# Patient Record
Sex: FEMALE | Race: WHITE | NOT HISPANIC OR LATINO | Employment: UNEMPLOYED | URBAN - METROPOLITAN AREA
[De-identification: names, ages, dates, MRNs, and addresses within clinical notes are randomized per-mention and may not be internally consistent; named-entity substitution may affect disease eponyms.]

---

## 2017-01-12 PROBLEM — G47.33 OSA (OBSTRUCTIVE SLEEP APNEA): Status: ACTIVE | Noted: 2017-01-12

## 2017-01-12 PROBLEM — E66.01 SEVERE OBESITY (BMI 35.0-39.9): Status: ACTIVE | Noted: 2017-01-12

## 2017-01-12 PROBLEM — I10 ESSENTIAL HYPERTENSION: Status: ACTIVE | Noted: 2017-01-12

## 2017-01-12 PROBLEM — E11.9 TYPE 2 DIABETES MELLITUS WITHOUT COMPLICATION: Status: ACTIVE | Noted: 2017-01-12

## 2019-03-26 ENCOUNTER — HISTORICAL (OUTPATIENT)
Dept: ADMINISTRATIVE | Facility: HOSPITAL | Age: 49
End: 2019-03-26

## 2019-03-26 LAB
ABS NEUT (OLG): 4.68 X10(3)/MCL (ref 2.1–9.2)
ALBUMIN SERPL-MCNC: 4.2 GM/DL (ref 3.4–5)
ALBUMIN/GLOB SERPL: 1.2 RATIO (ref 1.1–2)
ALP SERPL-CCNC: 105 UNIT/L (ref 45–117)
ALT SERPL-CCNC: 36 UNIT/L (ref 12–78)
AST SERPL-CCNC: 16 UNIT/L (ref 15–37)
BASOPHILS # BLD AUTO: 0.04 X10(3)/MCL
BASOPHILS NFR BLD AUTO: 0 %
BILIRUB SERPL-MCNC: 0.7 MG/DL (ref 0.2–1)
BILIRUBIN DIRECT+TOT PNL SERPL-MCNC: 0.2 MG/DL
BILIRUBIN DIRECT+TOT PNL SERPL-MCNC: 0.5 MG/DL
BUN SERPL-MCNC: 8 MG/DL (ref 7–18)
CALCIUM SERPL-MCNC: 9.3 MG/DL (ref 8.5–10.1)
CHLORIDE SERPL-SCNC: 102 MMOL/L (ref 98–107)
CO2 SERPL-SCNC: 30 MMOL/L (ref 21–32)
CREAT SERPL-MCNC: 0.8 MG/DL (ref 0.6–1.3)
CRP SERPL-MCNC: 0.6 MG/DL
EOSINOPHIL # BLD AUTO: 0.22 X10(3)/MCL
EOSINOPHIL NFR BLD AUTO: 2 %
ERYTHROCYTE [DISTWIDTH] IN BLOOD BY AUTOMATED COUNT: 11.8 % (ref 11.5–14.5)
ERYTHROCYTE [SEDIMENTATION RATE] IN BLOOD: 2 MM/HR (ref 0–20)
GLOBULIN SER-MCNC: 3.4 GM/ML (ref 2.3–3.5)
GLUCOSE SERPL-MCNC: 228 MG/DL (ref 74–106)
HBV CORE AB SERPL QL IA: NONREACTIVE
HBV CORE IGM SERPL QL IA: NONREACTIVE
HBV SURFACE AG SERPL QL IA: NEGATIVE
HCT VFR BLD AUTO: 40.8 % (ref 35–46)
HCV AB SERPL QL IA: NONREACTIVE
HGB BLD-MCNC: 14.4 GM/DL (ref 12–16)
IMM GRANULOCYTES # BLD AUTO: 0.03 10*3/UL
IMM GRANULOCYTES NFR BLD AUTO: 0 %
LYMPHOCYTES # BLD AUTO: 3.08 X10(3)/MCL
LYMPHOCYTES NFR BLD AUTO: 36 % (ref 13–40)
MCH RBC QN AUTO: 31.9 PG (ref 26–34)
MCHC RBC AUTO-ENTMCNC: 35.3 GM/DL (ref 31–37)
MCV RBC AUTO: 90.3 FL (ref 80–100)
MONOCYTES # BLD AUTO: 0.61 X10(3)/MCL
MONOCYTES NFR BLD AUTO: 7 % (ref 4–12)
NEG CONT SPOT COUNT: NORMAL
NEUTROPHILS # BLD AUTO: 4.68 X10(3)/MCL
NEUTROPHILS NFR BLD AUTO: 54 X10(3)/MCL
PANEL A SPOT COUNT: 0
PANEL B SPOT COUNT: 0
PLATELET # BLD AUTO: 170 X10(3)/MCL (ref 130–400)
PMV BLD AUTO: 12.1 FL (ref 7.4–10.4)
POS CONT SPOT COUNT: NORMAL
POTASSIUM SERPL-SCNC: 3.7 MMOL/L (ref 3.5–5.1)
PROT SERPL-MCNC: 7.6 GM/DL (ref 6.4–8.2)
RBC # BLD AUTO: 4.52 X10(6)/MCL (ref 4–5.2)
SODIUM SERPL-SCNC: 136 MMOL/L (ref 136–145)
T-SPOT.TB: NORMAL
WBC # SPEC AUTO: 8.7 X10(3)/MCL (ref 4.5–11)

## 2019-08-08 ENCOUNTER — HISTORICAL (OUTPATIENT)
Dept: ADMINISTRATIVE | Facility: HOSPITAL | Age: 49
End: 2019-08-08

## 2019-08-08 LAB
ABS NEUT (OLG): 4.56 X10(3)/MCL (ref 2.1–9.2)
ALBUMIN SERPL-MCNC: 4.2 GM/DL (ref 3.4–5)
ALBUMIN/GLOB SERPL: 1.2 RATIO (ref 1.1–2)
ALP SERPL-CCNC: 115 UNIT/L (ref 45–117)
ALT SERPL-CCNC: 33 UNIT/L (ref 12–78)
AST SERPL-CCNC: 14 UNIT/L (ref 15–37)
BASOPHILS # BLD AUTO: 0.03 X10(3)/MCL
BASOPHILS NFR BLD AUTO: 0 %
BILIRUB SERPL-MCNC: 0.7 MG/DL (ref 0.2–1)
BILIRUBIN DIRECT+TOT PNL SERPL-MCNC: 0.2 MG/DL
BILIRUBIN DIRECT+TOT PNL SERPL-MCNC: 0.5 MG/DL
BUN SERPL-MCNC: 7 MG/DL (ref 7–18)
CALCIUM SERPL-MCNC: 9.4 MG/DL (ref 8.5–10.1)
CHLORIDE SERPL-SCNC: 104 MMOL/L (ref 98–107)
CO2 SERPL-SCNC: 30 MMOL/L (ref 21–32)
CREAT SERPL-MCNC: 0.7 MG/DL (ref 0.6–1.3)
CRP SERPL-MCNC: 0.5 MG/DL
EOSINOPHIL # BLD AUTO: 0.34 X10(3)/MCL
EOSINOPHIL NFR BLD AUTO: 3 %
ERYTHROCYTE [DISTWIDTH] IN BLOOD BY AUTOMATED COUNT: 11.7 % (ref 11.5–14.5)
ERYTHROCYTE [SEDIMENTATION RATE] IN BLOOD: 6 MM/HR (ref 0–20)
GLOBULIN SER-MCNC: 3.4 GM/ML (ref 2.3–3.5)
GLUCOSE SERPL-MCNC: 162 MG/DL (ref 74–106)
HCT VFR BLD AUTO: 42.7 % (ref 35–46)
HGB BLD-MCNC: 14.7 GM/DL (ref 12–16)
IMM GRANULOCYTES # BLD AUTO: 0.02 10*3/UL
IMM GRANULOCYTES NFR BLD AUTO: 0 %
LYMPHOCYTES # BLD AUTO: 4.68 X10(3)/MCL
LYMPHOCYTES NFR BLD AUTO: 46 % (ref 13–40)
MCH RBC QN AUTO: 31.5 PG (ref 26–34)
MCHC RBC AUTO-ENTMCNC: 34.4 GM/DL (ref 31–37)
MCV RBC AUTO: 91.4 FL (ref 80–100)
MONOCYTES # BLD AUTO: 0.59 X10(3)/MCL
MONOCYTES NFR BLD AUTO: 6 % (ref 0–24)
NEUTROPHILS # BLD AUTO: 4.56 X10(3)/MCL
NEUTROPHILS NFR BLD AUTO: 45 X10(3)/MCL
PLATELET # BLD AUTO: 174 X10(3)/MCL (ref 130–400)
PMV BLD AUTO: 11.8 FL (ref 7.4–10.4)
POTASSIUM SERPL-SCNC: 4 MMOL/L (ref 3.5–5.1)
PROT SERPL-MCNC: 7.6 GM/DL (ref 6.4–8.2)
RBC # BLD AUTO: 4.67 X10(6)/MCL (ref 4–5.2)
SODIUM SERPL-SCNC: 140 MMOL/L (ref 136–145)
WBC # SPEC AUTO: 10.2 X10(3)/MCL (ref 4.5–11)

## 2021-02-08 ENCOUNTER — HISTORICAL (OUTPATIENT)
Dept: ADMINISTRATIVE | Facility: HOSPITAL | Age: 51
End: 2021-02-08

## 2022-04-11 ENCOUNTER — HISTORICAL (OUTPATIENT)
Dept: ADMINISTRATIVE | Facility: HOSPITAL | Age: 52
End: 2022-04-11

## 2022-04-25 VITALS
DIASTOLIC BLOOD PRESSURE: 84 MMHG | BODY MASS INDEX: 35.16 KG/M2 | OXYGEN SATURATION: 99 % | SYSTOLIC BLOOD PRESSURE: 127 MMHG | WEIGHT: 198.44 LBS | HEIGHT: 63 IN

## 2022-06-28 DIAGNOSIS — L40.50 PSORIATIC ARTHRITIS: Primary | ICD-10-CM

## 2022-06-28 RX ORDER — FOLIC ACID 1 MG/1
1000 TABLET ORAL DAILY
COMMUNITY
Start: 2022-06-24 | End: 2022-09-06 | Stop reason: SDUPTHER

## 2022-06-28 RX ORDER — AZITHROMYCIN 250 MG/1
250 TABLET, FILM COATED ORAL
COMMUNITY
Start: 2022-01-04 | End: 2022-12-01 | Stop reason: ALTCHOICE

## 2022-06-28 RX ORDER — IXEKIZUMAB 80 MG/ML
80 INJECTION, SOLUTION SUBCUTANEOUS
Qty: 3 EACH | Refills: 1 | Status: SHIPPED | OUTPATIENT
Start: 2022-06-28 | End: 2022-12-01 | Stop reason: SDUPTHER

## 2022-06-28 RX ORDER — IXEKIZUMAB 80 MG/ML
INJECTION, SOLUTION SUBCUTANEOUS
COMMUNITY
Start: 2021-11-08 | End: 2022-06-28 | Stop reason: SDUPTHER

## 2022-06-28 RX ORDER — MUPIROCIN 20 MG/G
OINTMENT TOPICAL 3 TIMES DAILY
COMMUNITY
Start: 2022-01-04

## 2022-06-28 RX ORDER — ATORVASTATIN CALCIUM 10 MG/1
10 TABLET, FILM COATED ORAL DAILY
COMMUNITY
Start: 2022-06-24

## 2022-06-28 RX ORDER — INSULIN LISPRO 100 [IU]/ML
INJECTION, SOLUTION INTRAVENOUS; SUBCUTANEOUS
COMMUNITY
Start: 2021-08-09 | End: 2023-08-07 | Stop reason: ALTCHOICE

## 2022-06-28 RX ORDER — POTASSIUM CHLORIDE 750 MG/1
10 CAPSULE, EXTENDED RELEASE ORAL DAILY
COMMUNITY
Start: 2022-06-24 | End: 2022-12-01 | Stop reason: SDUPTHER

## 2022-06-28 RX ORDER — INSULIN DETEMIR 100 [IU]/ML
INJECTION, SOLUTION SUBCUTANEOUS
COMMUNITY
Start: 2021-08-09 | End: 2022-12-01 | Stop reason: SDUPTHER

## 2022-06-28 RX ORDER — PEN NEEDLE, DIABETIC 31 GX5/16"
1 NEEDLE, DISPOSABLE MISCELLANEOUS 3 TIMES DAILY
COMMUNITY
Start: 2022-01-18

## 2022-06-28 NOTE — TELEPHONE ENCOUNTER
----- Message from Enma Andrew sent at 6/28/2022 10:24 AM CDT -----  Regarding: Medication Management  Pt is in need of Rx TALTZ INJECTION. Pt needs it sent to [facility].    Thank You

## 2022-09-06 DIAGNOSIS — L40.50 PSORIATIC ARTHRITIS: Primary | ICD-10-CM

## 2022-09-06 RX ORDER — NIRMATRELVIR AND RITONAVIR 300-100 MG
KIT ORAL
COMMUNITY
Start: 2022-07-11 | End: 2022-12-01

## 2022-09-06 RX ORDER — FOLIC ACID 1 MG/1
1000 TABLET ORAL DAILY
Qty: 30 TABLET | Refills: 3 | Status: SHIPPED | OUTPATIENT
Start: 2022-09-06 | End: 2022-12-01 | Stop reason: SDUPTHER

## 2022-09-07 NOTE — TELEPHONE ENCOUNTER
PA approved  Refills of both Taltz & Otrexup sent to [facility] pharmacy    Called pt to let her know everything in place and that she should call me if she has any issues  Pt agreed

## 2022-11-22 PROBLEM — L40.9 PSORIASIS: Status: ACTIVE | Noted: 2022-11-22

## 2022-11-22 PROBLEM — L40.50 PSORIASIS WITH ARTHROPATHY: Status: ACTIVE | Noted: 2022-11-22

## 2022-11-22 PROBLEM — I10 PRIMARY HYPERTENSION: Status: ACTIVE | Noted: 2017-01-12

## 2022-11-22 PROBLEM — M79.7 FIBROMYALGIA: Status: ACTIVE | Noted: 2022-11-22

## 2022-11-22 PROBLEM — G47.33 OBSTRUCTIVE SLEEP APNEA SYNDROME: Status: ACTIVE | Noted: 2017-01-12

## 2022-11-22 PROBLEM — G57.03 PIRIFORMIS SYNDROME OF BOTH SIDES: Status: ACTIVE | Noted: 2022-11-22

## 2022-11-22 PROBLEM — Z79.899 HIGH RISK MEDICATION USE: Status: ACTIVE | Noted: 2022-11-22

## 2022-11-22 PROBLEM — M19.90 OSTEOARTHRITIS: Status: ACTIVE | Noted: 2022-11-22

## 2022-11-30 PROBLEM — L40.50 PSORIATIC ARTHRITIS: Status: ACTIVE | Noted: 2022-11-30

## 2022-11-30 PROBLEM — E66.9 OBESITY: Status: ACTIVE | Noted: 2022-11-30

## 2022-11-30 PROBLEM — M06.00 SERONEGATIVE RHEUMATOID ARTHRITIS: Status: ACTIVE | Noted: 2022-11-30

## 2022-11-30 PROBLEM — M25.50 ARTHRALGIA: Status: ACTIVE | Noted: 2022-11-30

## 2022-11-30 NOTE — PROGRESS NOTES
"  Patient ID: [id number]     Chief Complaint: Follow up- New to [facility] (Right toe, knees, and shoulder has been hurting more now. /Right ringers go numb often. /Cannot make a fist on the right hand, middle finger will not close down.  /Lower back still hurts a lot. /Right elbow was hit months ago, still causing pain. )      Referred By: No ref. provider found     HPI:     Aneta Tyson is a 52 y.o. female here today for a follow up for psoriatic arthritis.      History of psoriatic arthritis and Fibromylagia.  Diagnosed with psoriasis around 1997, diagnosed with psoriatic arthritis around 2015. She started methotrexate in the past, stopped due to elevated liver enzymes. Otezla was started and significant GI  issues. In late October 2017,  changed to Stelara. At f/u she  found improvement with Stelara in her skin, but not in her joints and lost efficacy.  Has failed Enbrel in the past. Started Taltz in April 2019 w/good response w/skin and joints. Injection site reaction reported lasting 3-4 days. She is a former ballet dancer, used to wear toe shoes. Pain is with flexion of the great toes. Xrays show loss of joint space.    Today she C/o right shoulder/elbow/knee and left great toe are painful- denies any red/swelling or warmth to these joints.  She is asking to have her right shoulder injected today due to pain- states limited ROM, + pain when laying on her right side at night time. States bilateral feet "burn" at night time- most likely neuropathy from DM.  Denies red/warm/swollen joints of hands. Does have right hand trigger finger to middle finger- has had a injection in the past with no relief- does need surgery but is looking for an orthopedic that takes her insurance- she does live 2.5 hours away but would like referral to orthopedics here for eval as she is also having some elbow pain as well that is shooting into her hand at times.   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 She does have a nodule to her right inner elbow and right wrist, denies pain to these areas.     Past medical history is notable for COPD, hernia, GERD, hypothyroidism, hypertension, and hyperlipidemia.    Denies history of fevers, rashes, photosensitivity, oral or nasal ulcers, h/o MI, stroke, seizures, h/o PE or DVT, Raynaud's phenomenon, uveitis, malignancies.     Family history of autoimmune disease: none.    Pregnancies: 1 (twins)  Miscarriages: none.     Smoking: occ smoking/vaps a few times a months- enc to quit- discussed increased risk of inflammation with tobacco products.   Social History     Tobacco Use   Smoking Status Some Days    Types: Cigarettes, Vaping with nicotine   Smokeless Tobacco Not on file   Tobacco Comments    Smokes and vapes occasionally. One pack lasts about a month.      ----------------------------  COPD (chronic obstructive pulmonary disease)  Diabetes mellitus  GERD (gastroesophageal reflux disease)  Hypertension  Psoriatic arthritis  Thyroid disease     Past Surgical History:   Procedure Laterality Date    ADENOIDECTOMY      APPENDECTOMY      CARPAL TUNNEL RELEASE       SECTION      CHOLECYSTECTOMY      COLONOSCOPY      HYSTERECTOMY      NISSEN FUNDOPLICATION      TONSILLECTOMY      UVULECTOMY         Review of patient's allergies indicates:   Allergen Reactions    Doxycycline Diarrhea     severe    Promethazine Nausea And Vomiting       Outpatient Medications Marked as Taking for the 22 encounter (Office Visit) with Tia Chase MD   Medication Sig Dispense Refill    atorvastatin (LIPITOR) 10 MG tablet Take 10 mg by mouth once daily.      BD ULTRA-FINE MINI PEN NEEDLE 31 gauge x 3/16" Ndle Inject 1 each into the skin 3 (three) times daily.      cetirizine (ZYRTEC) 10 MG tablet Take 10 mg by mouth once daily.      duloxetine (CYMBALTA) 60 MG capsule Take 60 mg by mouth once daily.      glipiZIDE (GLUCOTROL) 10 MG TR24 Take 10 " mg by mouth 2 (two) times daily.      hydrocodone-acetaminophen 5-325mg (NORCO) 5-325 mg per tablet Take 1 tablet by mouth 3 (three) times daily as needed for Pain.      insulin lispro 100 unit/mL injection Inject into the skin.      levothyroxine (SYNTHROID) 50 MCG tablet Take 50 mcg by mouth once daily.      lisinopril (PRINIVIL,ZESTRIL) 20 MG tablet Take 20 mg by mouth once daily.      montelukast (SINGULAIR) 10 mg tablet Take 10 mg by mouth every evening.      mupirocin (BACTROBAN) 2 % ointment Apply topically 3 (three) times daily.      pantoprazole (PROTONIX) 40 MG tablet Take 40 mg by mouth once daily.      potassium chloride (KLOR-CON) 10 MEQ TbSR Take 10 mEq by mouth.      TRESIBA FLEXTOUCH U-100 100 unit/mL (3 mL) insulin pen Inject into the skin.      verapamil (CALAN-SR) 240 MG CR tablet Take 240 mg by mouth every evening.      [DISCONTINUED] folic acid (FOLVITE) 1 MG tablet Take 1 tablet (1,000 mcg total) by mouth once daily. 30 tablet 3    [DISCONTINUED] ixekizumab (TALTZ AUTOINJECTOR) 80 mg/mL AtIn Inject 80 mg into the skin every 28 days. Rotate injection sites, hold for infection 3 each 1    [DISCONTINUED] methotrexate, PF, (OTREXUP) 20 mg/0.4 mL AtIn Inject 0.4 mLs (20 mg total) into the skin every 7 days. 1.6 mL 3     Current Facility-Administered Medications for the 12/1/22 encounter (Office Visit) with Tia Chase MD   Medication Dose Route Frequency Provider Last Rate Last Admin    triamcinolone acetonide injection 40 mg  40 mg Intra-articular 1 time in Clinic/HOD CHINO AdamsP        [DISCONTINUED] LIDOcaine HCL 10 mg/ml (1%) injection 1 mL  1 mL Intra-articular 1 time in Clinic/BRUNO Vogt        [DISCONTINUED] LIDOcaine HCL 10 mg/ml (1%) injection 1 mL  1 mL Intra-articular 1 time in Clinic/BRUNO Vogt           Social History     Socioeconomic History    Marital status: Single   Tobacco Use    Smoking status: Some Days      Types: Cigarettes, Vaping with nicotine    Tobacco comments:     Smokes and vapes occasionally. One pack lasts about a month.   Substance and Sexual Activity    Alcohol use: No        Family History   Problem Relation Age of Onset    Hypertension Mother     Atrial fibrillation Father     Heart disease Father     Diabetes Maternal Grandmother           There is no immunization history on file for this patient.    Patient Care Team:  Donny Recio MD as PCP - General (Family Medicine)     Subjective:     ROS    Constitutional:  Denies chills. Denies fever. Denies night sweats. Denies weight loss.   Ophthalmology: Admits blurred vision from DM retinopathy- follows with ophthalmology. Denies dry eyes. Denies eye pain. Denies Itching and redness.   ENT: Denies oral ulcers. Denies epistaxis. Admits dry mouth when taking Norco. Denies swollen glands.   Endocrine: Admits diabetes. Admits thyroid Problems- hypothyroidism on Synthroid-both followed by PCP.  Respiratory: Denies cough. Denies shortness of breath. Denies shortness of breath with exertion. Denies hemoptysis.   Cardiovascular: Denies chest pain at rest. Denies chest pain with exertion. Denies palpitations.    Gastrointestinal: Denies abdominal pain. Admits diarrhea and constipation at times, follows with GI- does have history of Diverticulosis, no IBD. Denies nausea. Denies vomiting. Denies hematemesis or hematochezia. Denies heartburn.  Genitourinary: Denies blood in urine.  Musculoskeletal: See HPI for details  Integumentary: Denies rash. Denies photosensitivity.   Peripheral Vascular: Denies Ulcers of hands and/or feet. Denies Cold extremities.   Neurologic: Denies dizziness. Admits headache for the past few days- feels sinus related.  Denies loss of strength. Admits burning to bilateral feet at night time- most likely neuropathy from DM.  Psychiatric: Denies depression. Denies anxiety. Denies suicidal/homicidal ideations.      Objective:     /79  "(BP Location: Right arm, Patient Position: Sitting)   Pulse 97   Temp 98.6 °F (37 °C) (Oral)   Resp 16   Ht 5' 2" (1.575 m)   Wt 89.9 kg (198 lb 3.2 oz)   SpO2 96%   BMI 36.25 kg/m²     Physical Exam    General Appearance: alert, pleasant, in no acute distress.  Skin: Skin color, texture, turgor normal. No rashes. 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 3-4 cm annular lesions with irregular margins and slightly erythematous and raised, non tender, on bilateral shins, 3-4 in number.   ENT: No oral or nasal ulcers.  Neck:  Neck supple. No adenopathy.   Lungs: CTA throughout without crackles, rhonchi, or wheezes.   Heart: RRR w/o murmurs.  No edema. 2+ DP pulse.  Neuro: Alert, oriented, CN II-XII GI, sensory and motor innervation intact.  Musculoskeletal: See below  Psych: Alert, oriented, normal eye contact.    Joint Exam:  DIPs, PIPs, MCPs: + pain on palpation to bilateral MTPs, synovial thickening noted to bilateral MCPs, no swelling or redness, + nodule noted to left wrist, nontender.  Wrists: no pain on palpation, no swelling or redness, good range of motion, + nodule noted to left wrist, no pain on palpation.  Elbows: no pain on palpation, no swelling or redness, good range of motion, no nodules.  Shoulders: + pain on palpation, no swelling or redness, pain with range of motion noted to right shoulder, limited Apley.  Hips: no pain on palpation, good range of motion.  Knees: no pain on palpation, +crepitus with knees, no swelling or redness, good range of motion.  Ankles: no pain on palpation, no swelling or redness, good range of motion.  Feet: +MTP pain on palpation to bilateral feet, no swelling or redness, no nodules.    Labs:   2/8/2021: Infectious work up (-) - will repeat at f/u visit     Lab Results   Component Value Date    WBC 7.9 12/01/2022    HGB 13.7 12/01/2022    HCT 40.4 12/01/2022     12/01/2022    ALT 25 12/01/2022    AST 14 12/01/2022    " BUN 14.6 12/01/2022    CREATININE 0.90 12/01/2022     12/01/2022    K 4.3 12/01/2022    CO2 25 12/01/2022    CRP 2.00 12/01/2022    SEDRATE 1 12/01/2022        Imaging:   3/26/2019 XR Hand 3 views of both hands: IMPRESSION: Left/Right: No evidence for inflammatory arthropathy or osteoarthritis. Radial angulation of the distal interphalangeal joints of the fifth fingers. Mild positive ulnar variance.    Left foot 3 views: Tiny anterior calcaneal spur. IMPRESSION: No significant abnormality identified  Right foot 3 views: Minimal narrowing of the distal interphalangeal joints articular spaces are otherwise normal.    XR Chest 2 Views: IMPRESSION: No acute Cardiopulmonary process identified.     2/8/2021- XR Lumbar spine 4 views or more: Slight levoscoliosis with maximum curvature at the level of L2/L3 vertebral bodies are of normal height, position and alignment with some degenerative changes of the posterior elements mostly at L5-S1. IMPRESSION: Minimal degenerative changes.    Assessment:       ICD-10-CM ICD-9-CM   1. Psoriatic arthritis  L40.50 696.0   2. Psoriasis  L40.9 696.1   3. Fibromyalgia  M79.7 729.1   4. High risk medication use  Z79.899 V58.69   5. Osteoarthritis, unspecified osteoarthritis type, unspecified site  M19.90 715.90   6. Primary hypertension  I10 401.9   7. Type 2 diabetes mellitus without complication, with long-term current use of insulin  E11.9 250.00    Z79.4 V58.67   8. JENNIFER (obstructive sleep apnea)  G47.33 327.23   9. Obesity, unspecified classification, unspecified obesity type, unspecified whether serious comorbidity present  E66.9 278.00   10. Trigger middle finger of right hand  M65.331 727.03   11. Chronic right shoulder pain  M25.511 719.41    G89.29 338.29        Plan:     1. Psoriasic Arthritis and Psoriasis: Dx with psoriasis 1997, dx with psoriatic arthritis 2015. -Failed treatment with MTX (elevated liver enzymes). Otezla - significant GI  issues. Stelara- lost efficacy.   Has also failed Enbrel in the past.  - Continue Taltz 80mg every 4 weeks subcu.   - Otrexup 20mg/subcu weekly.   -Continue daily folic acid supplements.     3. Fibromyalgia   -Reinforced sleep hygiene, control of underlying anxiety and depression, and encouraged gentle exercise.     4. High risk medication use   -Advised to stay up-to-date on age appropriate vaccinations and malignancy screening with PCP.   -Continued lab monitoring     5. Osteoarthritis  -Knees and b/l 1st MTP joint. +crepitus with knees.   -OTC topicals ok to continue to use, warm compresses and weight loss.      6. Primary hypertension and T2DM   -Followed by PCP      8. JENNIFER (obstructive sleep apnea)   -Enc use of CPAP/followed by PCP      9. Obesity  -Enc diet modifications and exercise as tolerated (TLC)       10. Trigger finger of middle right hand         -Referral to Ortho made for further eval  11. Right Shoulder pain   -Verbal consent was obtained. The patient was placed in the appropriate anatomic position. The area was marked and prepped to created a sterile environment. 1 ml kenalog 40mg placed in a syringe. Using a 21 gauge needle, 40 mg of Kenolog was injected in the the right posterior GH joint, by sterile technique. The patient tolerated the procedure well. The patient was hemostatic post procedure. Skin care directions were given.    -If no relief she was advised to follow up with her PCP for possible PT    Follow up in about 6 months (around 6/1/2023). In addition to their scheduled follow up, the patient has also been instructed to follow up on as needed basis.      Total time spent with patient and documentation is more than 60 minutes. All questions were answered to patient's satisfaction and patient verbalized understanding.

## 2022-12-01 ENCOUNTER — LAB VISIT (OUTPATIENT)
Dept: LAB | Facility: HOSPITAL | Age: 52
End: 2022-12-01
Attending: NURSE PRACTITIONER
Payer: MEDICAID

## 2022-12-01 ENCOUNTER — OFFICE VISIT (OUTPATIENT)
Dept: RHEUMATOLOGY | Facility: CLINIC | Age: 52
End: 2022-12-01
Payer: MEDICAID

## 2022-12-01 VITALS
RESPIRATION RATE: 16 BRPM | WEIGHT: 198.19 LBS | TEMPERATURE: 99 F | SYSTOLIC BLOOD PRESSURE: 128 MMHG | HEIGHT: 62 IN | HEART RATE: 97 BPM | OXYGEN SATURATION: 96 % | DIASTOLIC BLOOD PRESSURE: 79 MMHG | BODY MASS INDEX: 36.47 KG/M2

## 2022-12-01 DIAGNOSIS — M79.7 FIBROMYALGIA: ICD-10-CM

## 2022-12-01 DIAGNOSIS — L40.50 PSORIATIC ARTHRITIS: Primary | ICD-10-CM

## 2022-12-01 DIAGNOSIS — I10 PRIMARY HYPERTENSION: ICD-10-CM

## 2022-12-01 DIAGNOSIS — M19.90 OSTEOARTHRITIS, UNSPECIFIED OSTEOARTHRITIS TYPE, UNSPECIFIED SITE: ICD-10-CM

## 2022-12-01 DIAGNOSIS — L40.9 PSORIASIS: ICD-10-CM

## 2022-12-01 DIAGNOSIS — M65.331 TRIGGER MIDDLE FINGER OF RIGHT HAND: ICD-10-CM

## 2022-12-01 DIAGNOSIS — Z79.4 TYPE 2 DIABETES MELLITUS WITHOUT COMPLICATION, WITH LONG-TERM CURRENT USE OF INSULIN: ICD-10-CM

## 2022-12-01 DIAGNOSIS — L40.50 PSORIATIC ARTHRITIS: ICD-10-CM

## 2022-12-01 DIAGNOSIS — E11.9 TYPE 2 DIABETES MELLITUS WITHOUT COMPLICATION, WITH LONG-TERM CURRENT USE OF INSULIN: ICD-10-CM

## 2022-12-01 DIAGNOSIS — G89.29 CHRONIC RIGHT SHOULDER PAIN: ICD-10-CM

## 2022-12-01 DIAGNOSIS — Z79.899 HIGH RISK MEDICATION USE: ICD-10-CM

## 2022-12-01 DIAGNOSIS — G47.33 OSA (OBSTRUCTIVE SLEEP APNEA): ICD-10-CM

## 2022-12-01 DIAGNOSIS — M25.511 CHRONIC RIGHT SHOULDER PAIN: ICD-10-CM

## 2022-12-01 DIAGNOSIS — E66.9 OBESITY, UNSPECIFIED CLASSIFICATION, UNSPECIFIED OBESITY TYPE, UNSPECIFIED WHETHER SERIOUS COMORBIDITY PRESENT: ICD-10-CM

## 2022-12-01 PROBLEM — M06.00 SERONEGATIVE RHEUMATOID ARTHRITIS: Status: RESOLVED | Noted: 2022-11-30 | Resolved: 2022-12-01

## 2022-12-01 LAB
ALBUMIN SERPL-MCNC: 4.2 GM/DL (ref 3.5–5)
ALBUMIN/GLOB SERPL: 1.7 RATIO (ref 1.1–2)
ALP SERPL-CCNC: 85 UNIT/L (ref 40–150)
ALT SERPL-CCNC: 25 UNIT/L (ref 0–55)
AST SERPL-CCNC: 14 UNIT/L (ref 5–34)
BASOPHILS # BLD AUTO: 0.02 X10(3)/MCL (ref 0–0.2)
BASOPHILS NFR BLD AUTO: 0.3 %
BILIRUBIN DIRECT+TOT PNL SERPL-MCNC: 0.7 MG/DL
BUN SERPL-MCNC: 14.6 MG/DL (ref 9.8–20.1)
CALCIUM SERPL-MCNC: 10.7 MG/DL (ref 8.4–10.2)
CHLORIDE SERPL-SCNC: 100 MMOL/L (ref 98–107)
CO2 SERPL-SCNC: 25 MMOL/L (ref 22–29)
CREAT SERPL-MCNC: 0.9 MG/DL (ref 0.55–1.02)
CRP SERPL-MCNC: 2 MG/L
EOSINOPHIL # BLD AUTO: 0.12 X10(3)/MCL (ref 0–0.9)
EOSINOPHIL NFR BLD AUTO: 1.5 %
ERYTHROCYTE [DISTWIDTH] IN BLOOD BY AUTOMATED COUNT: 11 % (ref 11.5–17)
ERYTHROCYTE [SEDIMENTATION RATE] IN BLOOD: 1 MM/HR (ref 0–20)
GFR SERPLBLD CREATININE-BSD FMLA CKD-EPI: >60 MLS/MIN/1.73/M2
GLOBULIN SER-MCNC: 2.5 GM/DL (ref 2.4–3.5)
GLUCOSE SERPL-MCNC: 419 MG/DL (ref 74–100)
HCT VFR BLD AUTO: 40.4 % (ref 37–47)
HGB BLD-MCNC: 13.7 GM/DL (ref 12–16)
IMM GRANULOCYTES # BLD AUTO: 0.01 X10(3)/MCL (ref 0–0.04)
IMM GRANULOCYTES NFR BLD AUTO: 0.1 %
LYMPHOCYTES # BLD AUTO: 2.95 X10(3)/MCL (ref 0.6–4.6)
LYMPHOCYTES NFR BLD AUTO: 37.2 %
MCH RBC QN AUTO: 31.8 PG (ref 27–31)
MCHC RBC AUTO-ENTMCNC: 33.9 MG/DL (ref 33–36)
MCV RBC AUTO: 93.7 FL (ref 80–94)
MONOCYTES # BLD AUTO: 0.46 X10(3)/MCL (ref 0.1–1.3)
MONOCYTES NFR BLD AUTO: 5.8 %
NEUTROPHILS # BLD AUTO: 4.4 X10(3)/MCL (ref 2.1–9.2)
NEUTROPHILS NFR BLD AUTO: 55.1 %
NRBC BLD AUTO-RTO: 0 %
PLATELET # BLD AUTO: 170 X10(3)/MCL (ref 130–400)
PMV BLD AUTO: 11.7 FL (ref 7.4–10.4)
POTASSIUM SERPL-SCNC: 4.3 MMOL/L (ref 3.5–5.1)
PROT SERPL-MCNC: 6.7 GM/DL (ref 6.4–8.3)
RBC # BLD AUTO: 4.31 X10(6)/MCL (ref 4.2–5.4)
SODIUM SERPL-SCNC: 137 MMOL/L (ref 136–145)
WBC # SPEC AUTO: 7.9 X10(3)/MCL (ref 4.5–11.5)

## 2022-12-01 PROCEDURE — 20610 PR DRAIN/INJECT LARGE JOINT/BURSA: ICD-10-PCS | Mod: S$PBB,RT,, | Performed by: INTERNAL MEDICINE

## 2022-12-01 PROCEDURE — 85651 RBC SED RATE NONAUTOMATED: CPT

## 2022-12-01 PROCEDURE — 4010F PR ACE/ARB THEARPY RXD/TAKEN: ICD-10-PCS | Mod: CPTII,,, | Performed by: INTERNAL MEDICINE

## 2022-12-01 PROCEDURE — 1160F RVW MEDS BY RX/DR IN RCRD: CPT | Mod: CPTII,,, | Performed by: INTERNAL MEDICINE

## 2022-12-01 PROCEDURE — 80053 COMPREHEN METABOLIC PANEL: CPT

## 2022-12-01 PROCEDURE — 85025 COMPLETE CBC W/AUTO DIFF WBC: CPT

## 2022-12-01 PROCEDURE — 20610 DRAIN/INJ JOINT/BURSA W/O US: CPT | Mod: PBBFAC | Performed by: INTERNAL MEDICINE

## 2022-12-01 PROCEDURE — 4010F ACE/ARB THERAPY RXD/TAKEN: CPT | Mod: CPTII,,, | Performed by: INTERNAL MEDICINE

## 2022-12-01 PROCEDURE — 3078F DIAST BP <80 MM HG: CPT | Mod: CPTII,,, | Performed by: INTERNAL MEDICINE

## 2022-12-01 PROCEDURE — 3074F SYST BP LT 130 MM HG: CPT | Mod: CPTII,,, | Performed by: INTERNAL MEDICINE

## 2022-12-01 PROCEDURE — 99215 OFFICE O/P EST HI 40 MIN: CPT | Mod: PBBFAC,25 | Performed by: INTERNAL MEDICINE

## 2022-12-01 PROCEDURE — 1160F PR REVIEW ALL MEDS BY PRESCRIBER/CLIN PHARMACIST DOCUMENTED: ICD-10-PCS | Mod: CPTII,,, | Performed by: INTERNAL MEDICINE

## 2022-12-01 PROCEDURE — 3074F PR MOST RECENT SYSTOLIC BLOOD PRESSURE < 130 MM HG: ICD-10-PCS | Mod: CPTII,,, | Performed by: INTERNAL MEDICINE

## 2022-12-01 PROCEDURE — 20610 DRAIN/INJ JOINT/BURSA W/O US: CPT | Mod: S$PBB,RT,, | Performed by: INTERNAL MEDICINE

## 2022-12-01 PROCEDURE — 3008F PR BODY MASS INDEX (BMI) DOCUMENTED: ICD-10-PCS | Mod: CPTII,,, | Performed by: INTERNAL MEDICINE

## 2022-12-01 PROCEDURE — 1159F MED LIST DOCD IN RCRD: CPT | Mod: CPTII,,, | Performed by: INTERNAL MEDICINE

## 2022-12-01 PROCEDURE — 36415 COLL VENOUS BLD VENIPUNCTURE: CPT

## 2022-12-01 PROCEDURE — 3078F PR MOST RECENT DIASTOLIC BLOOD PRESSURE < 80 MM HG: ICD-10-PCS | Mod: CPTII,,, | Performed by: INTERNAL MEDICINE

## 2022-12-01 PROCEDURE — 3008F BODY MASS INDEX DOCD: CPT | Mod: CPTII,,, | Performed by: INTERNAL MEDICINE

## 2022-12-01 PROCEDURE — 99215 PR OFFICE/OUTPT VISIT, EST, LEVL V, 40-54 MIN: ICD-10-PCS | Mod: S$PBB,25,, | Performed by: INTERNAL MEDICINE

## 2022-12-01 PROCEDURE — 86140 C-REACTIVE PROTEIN: CPT

## 2022-12-01 PROCEDURE — 99215 OFFICE O/P EST HI 40 MIN: CPT | Mod: S$PBB,25,, | Performed by: INTERNAL MEDICINE

## 2022-12-01 PROCEDURE — 20610 DRAIN/INJ JOINT/BURSA W/O US: CPT | Mod: PBBFAC,RT | Performed by: INTERNAL MEDICINE

## 2022-12-01 PROCEDURE — 1159F PR MEDICATION LIST DOCUMENTED IN MEDICAL RECORD: ICD-10-PCS | Mod: CPTII,,, | Performed by: INTERNAL MEDICINE

## 2022-12-01 RX ORDER — FOLIC ACID 1 MG/1
1000 TABLET ORAL DAILY
Qty: 30 TABLET | Refills: 11 | Status: SHIPPED | OUTPATIENT
Start: 2022-12-01 | End: 2023-03-21 | Stop reason: SDUPTHER

## 2022-12-01 RX ORDER — TRIAMCINOLONE ACETONIDE 40 MG/ML
40 INJECTION, SUSPENSION INTRA-ARTICULAR; INTRAMUSCULAR
Status: COMPLETED | OUTPATIENT
Start: 2022-12-01 | End: 2022-12-01

## 2022-12-01 RX ORDER — INSULIN DETEMIR 100 [IU]/ML
40 INJECTION, SOLUTION SUBCUTANEOUS 2 TIMES DAILY
COMMUNITY
Start: 2022-10-02 | End: 2022-12-01

## 2022-12-01 RX ORDER — LIDOCAINE HYDROCHLORIDE 10 MG/ML
1 INJECTION INFILTRATION; PERINEURAL
Status: DISCONTINUED | OUTPATIENT
Start: 2022-12-01 | End: 2022-12-01

## 2022-12-01 RX ORDER — POTASSIUM CHLORIDE 750 MG/1
10 TABLET, EXTENDED RELEASE ORAL
COMMUNITY
Start: 2022-07-20 | End: 2023-03-17 | Stop reason: SDUPTHER

## 2022-12-01 RX ORDER — INSULIN DEGLUDEC 100 U/ML
INJECTION, SOLUTION SUBCUTANEOUS
COMMUNITY
Start: 2022-11-14

## 2022-12-01 RX ORDER — IXEKIZUMAB 80 MG/ML
80 INJECTION, SOLUTION SUBCUTANEOUS
Qty: 3 EACH | Refills: 1 | Status: SHIPPED | OUTPATIENT
Start: 2022-12-01 | End: 2023-05-05 | Stop reason: SDUPTHER

## 2022-12-01 RX ADMIN — TRIAMCINOLONE ACETONIDE 40 MG: 40 INJECTION, SUSPENSION INTRA-ARTICULAR; INTRAMUSCULAR at 12:12

## 2022-12-06 ENCOUNTER — TELEPHONE (OUTPATIENT)
Dept: RHEUMATOLOGY | Facility: CLINIC | Age: 52
End: 2022-12-06
Payer: MEDICAID

## 2022-12-06 NOTE — TELEPHONE ENCOUNTER
----- Message from BRUNO Adams sent at 12/5/2022 10:24 AM CST -----  Please advise the patient the following lab results  12/2/22- Ca+ slightly elevated (10.7)-will continue to monitor at future lab visits, glucose very elevated at 419- please make sure she is taking her DM meds as directed and follow up with PCP- she should be monitoring her sugar levels at home. CBC ok, CRP/Sed normal

## 2022-12-06 NOTE — TELEPHONE ENCOUNTER
"Called pt to f/u on message re: lab results  Pt stated she's not surprised BG was so high, she ate a [employer] sandwich on white bread just before her appt  Pt saw her PCP yesterday and he "got on her" about her BG  PT verbalized understanding of lab results  Reminded her to call w/any questions or concerns and she agreed  "

## 2022-12-14 ENCOUNTER — TELEPHONE (OUTPATIENT)
Dept: RHEUMATOLOGY | Facility: CLINIC | Age: 52
End: 2022-12-14
Payer: MEDICAID

## 2023-03-06 ENCOUNTER — HOSPITAL ENCOUNTER (OUTPATIENT)
Dept: RADIOLOGY | Facility: HOSPITAL | Age: 53
Discharge: HOME | End: 2023-03-06
Attending: NURSE PRACTITIONER
Payer: MEDICAID

## 2023-03-06 ENCOUNTER — OFFICE VISIT (OUTPATIENT)
Dept: ORTHOPEDICS | Facility: CLINIC | Age: 53
End: 2023-03-06
Payer: MEDICAID

## 2023-03-06 VITALS — BODY MASS INDEX: 35.5 KG/M2 | HEIGHT: 63 IN | WEIGHT: 200.38 LBS

## 2023-03-06 DIAGNOSIS — M65.331 TRIGGER MIDDLE FINGER OF RIGHT HAND: Primary | ICD-10-CM

## 2023-03-06 DIAGNOSIS — M65.331 TRIGGER MIDDLE FINGER OF RIGHT HAND: ICD-10-CM

## 2023-03-06 PROCEDURE — 3008F BODY MASS INDEX DOCD: CPT | Mod: CPTII,,, | Performed by: NURSE PRACTITIONER

## 2023-03-06 PROCEDURE — 1160F RVW MEDS BY RX/DR IN RCRD: CPT | Mod: CPTII,,, | Performed by: NURSE PRACTITIONER

## 2023-03-06 PROCEDURE — 4010F ACE/ARB THERAPY RXD/TAKEN: CPT | Mod: CPTII,,, | Performed by: NURSE PRACTITIONER

## 2023-03-06 PROCEDURE — 3008F PR BODY MASS INDEX (BMI) DOCUMENTED: ICD-10-PCS | Mod: CPTII,,, | Performed by: NURSE PRACTITIONER

## 2023-03-06 PROCEDURE — 1159F PR MEDICATION LIST DOCUMENTED IN MEDICAL RECORD: ICD-10-PCS | Mod: CPTII,,, | Performed by: NURSE PRACTITIONER

## 2023-03-06 PROCEDURE — 1160F PR REVIEW ALL MEDS BY PRESCRIBER/CLIN PHARMACIST DOCUMENTED: ICD-10-PCS | Mod: CPTII,,, | Performed by: NURSE PRACTITIONER

## 2023-03-06 PROCEDURE — 73130 X-RAY EXAM OF HAND: CPT | Mod: TC,RT

## 2023-03-06 PROCEDURE — 4010F PR ACE/ARB THEARPY RXD/TAKEN: ICD-10-PCS | Mod: CPTII,,, | Performed by: NURSE PRACTITIONER

## 2023-03-06 PROCEDURE — 99213 PR OFFICE/OUTPT VISIT, EST, LEVL III, 20-29 MIN: ICD-10-PCS | Mod: S$PBB,,, | Performed by: NURSE PRACTITIONER

## 2023-03-06 PROCEDURE — 99214 OFFICE O/P EST MOD 30 MIN: CPT | Mod: PBBFAC | Performed by: NURSE PRACTITIONER

## 2023-03-06 PROCEDURE — 1159F MED LIST DOCD IN RCRD: CPT | Mod: CPTII,,, | Performed by: NURSE PRACTITIONER

## 2023-03-06 PROCEDURE — 99213 OFFICE O/P EST LOW 20 MIN: CPT | Mod: S$PBB,,, | Performed by: NURSE PRACTITIONER

## 2023-03-06 NOTE — PROGRESS NOTES
"  Subjective:       New patient   Patient ID: Aneta Tyson is a 52 y.o. female. Non-smoker. Employment HX: , currently seeking disability.    Seen [facility] ortho for same DX since n/a.   Chief Complaint: Pain of the Right Hand and Finger Pain    HPI:    Right sharp finger middle finger  pain. Right dominate  Injury:  carpal tunnel surgery to bilateral wrist 20 yrs ago   Onset: several years ago worsening over time  Modifying Factors: worse with activity, radiates to wrist, and increased pain at PM  Associated Symptoms: decreased  strength, decreased ROM, and locking  Activity: sedentary with light activity and pain mildly interferes with ADLs .   Previous Treatments: HEP , CSI since 2021 last injection 2021, and splints  without significant relief.  PMH: + DM  and + RA  Family History: + OA    Note: New referral for right middle trigger finger.  Previously injected by ortho surgeon in [facility], could not remember name.  Symptoms returned and worsen.  Locks frequently at night waking her up, very painful when locked.  Desires surgical treatment options.     Current Outpatient Medications:     atorvastatin (LIPITOR) 10 MG tablet, Take 10 mg by mouth once daily., Disp: , Rfl:     BD ULTRA-FINE MINI PEN NEEDLE 31 gauge x 3/16" Ndle, Inject 1 each into the skin 3 (three) times daily., Disp: , Rfl:     cetirizine (ZYRTEC) 10 MG tablet, Take 10 mg by mouth once daily., Disp: , Rfl:     duloxetine (CYMBALTA) 60 MG capsule, Take 60 mg by mouth once daily., Disp: , Rfl:     folic acid (FOLVITE) 1 MG tablet, Take 1 tablet (1,000 mcg total) by mouth once daily., Disp: 30 tablet, Rfl: 11    glipiZIDE (GLUCOTROL) 10 MG TR24, Take 10 mg by mouth 2 (two) times daily., Disp: , Rfl:     hydrocodone-acetaminophen 5-325mg (NORCO) 5-325 mg per tablet, Take 1 tablet by mouth 3 (three) times daily as needed for Pain., Disp: , Rfl:     insulin lispro 100 unit/mL injection, Inject into the skin., Disp: , Rfl:     ixekizumab " "(TALTZ AUTOINJECTOR) 80 mg/mL AtIn, Inject 80 mg into the skin every 28 days. Rotate injection sites, hold for infection, Disp: 3 each, Rfl: 1    levothyroxine (SYNTHROID) 50 MCG tablet, Take 50 mcg by mouth once daily., Disp: , Rfl:     lisinopril (PRINIVIL,ZESTRIL) 20 MG tablet, Take 20 mg by mouth once daily., Disp: , Rfl:     methotrexate, PF, (OTREXUP) 20 mg/0.4 mL AtIn, Inject 0.4 mLs (20 mg total) into the skin every 7 days., Disp: 1.6 mL, Rfl: 3    montelukast (SINGULAIR) 10 mg tablet, Take 10 mg by mouth every evening., Disp: , Rfl:     mupirocin (BACTROBAN) 2 % ointment, Apply topically 3 (three) times daily., Disp: , Rfl:     pantoprazole (PROTONIX) 40 MG tablet, Take 40 mg by mouth once daily., Disp: , Rfl:     potassium chloride (KLOR-CON) 10 MEQ TbSR, Take 10 mEq by mouth., Disp: , Rfl:     TRESIBA FLEXTOUCH U-100 100 unit/mL (3 mL) insulin pen, Inject into the skin., Disp: , Rfl:     verapamil (CALAN-SR) 240 MG CR tablet, Take 240 mg by mouth every evening., Disp: , Rfl:   Review of patient's allergies indicates:   Allergen Reactions    Doxycycline Diarrhea     severe    Promethazine Nausea And Vomiting     No results found for: HGBA1C   Body mass index is 35.5 kg/m².   Vitals:    03/06/23 1319 03/06/23 1320   Weight: 90.9 kg (200 lb 6.4 oz)    Height: 5' 3" (1.6 m)    PainSc:    5      Review of Systems:  A ten-point review of systems was performed and is negative, except as mentioned above.   Objective:   MSK Bilateral Hand/ Wrist  General:  no apparent distress, no pain indicators, obesity  Inspection: no masses/cyst/nodules, no swelling, no erythema, no contusion, no deconditioning, no deformity, no contracture, no scars  Palpation:  tenderness to RIGHT hand flexor tendon: middle, radial pulse equal 2+  ROM:    MCP/ PIP/ DIP Flexion  catching/ locking of middle painful (R)  Smooth movement without limitation non-painful (L)  MCP/ PIP/ DIP Extension  catching/ locking of middle painful (R)  Smooth " movement without limitation non-painful (L)  Strength:        4 / 5 (R) 5 / 5 (L)  Special Testing:  Trigger Finger   + middle (R)  -- (L)  Phalen    -- (R)  -- (L)  Pillo Carpal Compression -- (R)  -- (L)  Tinel Test    -- (R)  -- (L)  Finkelstein Test   -- (R)  -- (L)  Neurovascular: Intact to light touch  Neuro/ Psych: Awake, alert, oriented, normal mood and affect  Lymphatic: No LAD  Skin/ Soft Tissue: no rash, skin intact  Cardio: no edema, vascular integrity noted  Resp: no increased respiratory effort noted  Assessment:       Imaging: X-ray 3 views of right hand ordered, reviewed and independently interpreted by me. Discussed with patient. No acute findings .  Awaiting radiologist findings.     EMR Review: completed with noted Referral documentation reviewed    1. Trigger middle finger of right hand    2. BMI 35.0-35.9,adult      Plan:       Orders Placed This Encounter    X-Ray Hand Complete Right     Ongoing education about DX and treatment recommendations including conservative treatments of daily HEP and OTC NSAID as needed according to label instructions if able to tolerate.   Treatment plan: Trigger Finger right middle Patient will be referred to ortho res. for surgical eval. and treatment of trigger finger s/t failed conservative treatments.  Procedure: n/a  RX Medications: continue medications as RX per PCP     RTC: next available appt. with ortho res.     Chastity CARR    Timed Billing Note  Total Time Spent with Patient: 30 minutes .  Visit Start Time: 1315  10 minutes spent prior to visit reviewing EMR, prior labs and x-rays.  10 minutes spent in visit with patient face-to-face time completing exam, obtaining history, educating on DX and treatment plan.  10 minutes spent after visit completing EMR documentation.   Visit End Time: 1345

## 2023-03-17 ENCOUNTER — TELEPHONE (OUTPATIENT)
Dept: RHEUMATOLOGY | Facility: CLINIC | Age: 53
End: 2023-03-17
Payer: MEDICAID

## 2023-03-17 RX ORDER — POTASSIUM CHLORIDE 750 MG/1
10 CAPSULE, EXTENDED RELEASE ORAL DAILY
COMMUNITY
Start: 2023-02-20

## 2023-03-17 RX ORDER — SEMAGLUTIDE 1.34 MG/ML
INJECTION, SOLUTION SUBCUTANEOUS
COMMUNITY
Start: 2023-02-03 | End: 2024-02-07

## 2023-03-17 RX ORDER — SEMAGLUTIDE 1.34 MG/ML
INJECTION, SOLUTION SUBCUTANEOUS
COMMUNITY
Start: 2023-02-03 | End: 2023-08-07 | Stop reason: SDUPTHER

## 2023-03-17 NOTE — TELEPHONE ENCOUNTER
Called pt because we need CBC & CMP before we can send refill of Otrexup  Pt said right now she has a couple months worth of Otrexup because she's been sick a few times this winter and held the Otrexup and Taltz when she was sick  PT is going to her PCP to have labs done and will have them fax results to us

## 2023-03-17 NOTE — TELEPHONE ENCOUNTER
----- Message from Odalis Rader sent at 3/17/2023 11:55 AM CDT -----  Regarding: Med refill  Nasreen kinsey/ [facility] - [place], [place] - [facility] [employer] phone [phone] fax [phone] called for a refill of methotrexate, PF, (OTREXUP) 20 mg/0.4 mL AtIn. Pt contact is  [phone]        Thank You  Ariella WYATT

## 2023-03-21 DIAGNOSIS — L40.50 PSORIATIC ARTHRITIS: ICD-10-CM

## 2023-03-21 DIAGNOSIS — Z79.899 HIGH RISK MEDICATION USE: ICD-10-CM

## 2023-03-21 RX ORDER — FOLIC ACID 1 MG/1
1000 TABLET ORAL DAILY
Qty: 90 TABLET | Refills: 3 | Status: SHIPPED | OUTPATIENT
Start: 2023-03-21 | End: 2024-02-07 | Stop reason: SDUPTHER

## 2023-03-21 NOTE — TELEPHONE ENCOUNTER
----- Message from Rosa Calderon MA sent at 3/20/2023  3:38 PM CDT -----  Regarding: FW: Medication refill    ----- Message -----  From: Radha Esquivel  Sent: 3/20/2023   2:29 PM CDT  To: , #  Subject: Medication refill                                [facility] called stating patient need a new script for Folic Acid. [phone]

## 2023-03-27 DIAGNOSIS — L40.9 PSORIASIS: Primary | ICD-10-CM

## 2023-03-27 DIAGNOSIS — L40.50 PSORIATIC ARTHRITIS: ICD-10-CM

## 2023-03-27 NOTE — TELEPHONE ENCOUNTER
Need labs every 3 months on MTX, last labs in 12/2023. Please order CBC and CMP, after she does the blood work re propose the RX.

## 2023-04-27 NOTE — TELEPHONE ENCOUNTER
Called Dr. Recio's office again for labs to be faxed over.  Gave a different fax.  Will await labs

## 2023-04-28 DIAGNOSIS — L40.50 PSORIATIC ARTHRITIS: Primary | ICD-10-CM

## 2023-05-05 DIAGNOSIS — L40.50 PSORIATIC ARTHRITIS: ICD-10-CM

## 2023-05-05 DIAGNOSIS — L40.9 PSORIASIS: ICD-10-CM

## 2023-05-05 RX ORDER — IXEKIZUMAB 80 MG/ML
80 INJECTION, SOLUTION SUBCUTANEOUS
Qty: 3 EACH | Refills: 1 | Status: SHIPPED | OUTPATIENT
Start: 2023-05-05 | End: 2023-08-07 | Stop reason: SDUPTHER

## 2023-05-05 NOTE — TELEPHONE ENCOUNTER
----- Message from Odalis Rader sent at 5/5/2023 10:59 AM CDT -----  Regarding: Med refill  [facility] - [place], [place] - [address] [place] [address] [phone] fax [phone] called for a refill of ixekizumab (TALTZ AUTOINJECTOR) 80 mg/mL AtIn. Pt can be reached @ [phone]          Thank You  Ariella WYATT

## 2023-08-02 NOTE — PROGRESS NOTES
"  Patient ID: [id number]     Chief Complaint: Psoriatic Arthritis (Pt stated pain lower back,feet and rt shoulder)      HPI:     Aneta Tyson is a 52 y.o. female here today for a follow up for psoriatic arthritis.      History of psoriatic arthritis and Fibromylagia.  Diagnosed with psoriasis around 1997, diagnosed with psoriatic arthritis around 2015. She started methotrexate in the past, stopped due to elevated liver enzymes. Otezla was started and significant GI  issues. In late October 2017,  changed to Stelara. At f/u she  found improvement with Stelara in her skin, but not in her joints and lost efficacy.  Has failed Enbrel in the past. Started Taltz in April 2019 w/good response w/skin and joints. Injection site reaction reported lasting 3-4 days. She is a former ballet dancer, used to wear toe shoes. Pain is with flexion of the great toes. Xrays show loss of joint space.    December 2022: Today she C/o right shoulder/elbow/knee and left great toe are painful- denies any red/swelling or warmth to these joints.  She is asking to have her right shoulder injected today due to pain- states limited ROM, + pain when laying on her right side at night time. States bilateral feet "burn" at night time- most likely neuropathy from DM.  Denies red/warm/swollen joints of hands. Does have right hand trigger finger to middle finger- has had a injection in the past with no relief- does need surgery but is looking for an orthopedic that takes her insurance- she does live 2.5 hours away but would like referral to orthopedics here for eval as she is also having some elbow pain as well that is shooting into her hand at times.   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 She does have a nodule to her right inner elbow and right wrist, denies pain to these areas.     August 2023: Here today for follow up. Apt in June was cancelled and r/s today. Denies any " red/warm/swollen joints. Denies morning stiffness at this time, has continued Otrexup 20mg qweek and Talz 80 mg qmonth as directed and doing well, no rashes with current meds. She continues ot have some shoulder pain, right shoulder was injected back in December and did well, would like repeat injection today if possible. She denies any PT in the past. She states injection lasted a good 3-4 months and just starting to hurt. She states certain movements will cause pain to her shoulder,  has a hard time getting comfortable at night when laying her shoulder, she does take Norco usually 2-3 times a day, given by PCP. She states this does help. She did suffer with the loss of her father, very traumatic loss this past May 2023, offered my sincere condolences for her loss.     She does report a hospitalization since her last visit, states was diagnosed with pancreatitis after seeing us in December of 2022 at [facility] in [place], she denies any ETOH use and states she ate a lot of bread pudding and ended up in the ER and was kept for a few days... Her Otrexup was held during this time.     Denies any recent illness since last visit.     Past medical history is notable for COPD, hernia, GERD, hypothyroidism, hypertension, and hyperlipidemia.    Denies history of fevers, rashes, photosensitivity, oral or nasal ulcers, h/o MI, stroke, seizures, h/o PE or DVT, Raynaud's phenomenon, uveitis, malignancies.   Family history of autoimmune disease: none.  Pregnancies: 1 (twins)  Miscarriages: none.   Smoking: occ smoking/vaps a few times a months- enc to quit- and has decreased use, has not vaped for the past several weeks, congratulated her on this!    Social History     Tobacco Use   Smoking Status Some Days    Current packs/day: 0.00    Types: Vaping with nicotine   Smokeless Tobacco Not on file   Tobacco Comments    Smokes and vapes occasionally. One pack lasts about a month.     "  ----------------------------  COPD (chronic obstructive pulmonary disease)  Diabetes mellitus  GERD (gastroesophageal reflux disease)  Hypertension  Psoriatic arthritis  Thyroid disease     Past Surgical History:   Procedure Laterality Date    ADENOIDECTOMY      APPENDECTOMY      CARPAL TUNNEL RELEASE       SECTION      CHOLECYSTECTOMY      COLONOSCOPY      HYSTERECTOMY      NISSEN FUNDOPLICATION      TONSILLECTOMY      UVULECTOMY         Review of patient's allergies indicates:   Allergen Reactions    Doxycycline Diarrhea     severe    Promethazine Nausea And Vomiting       Outpatient Medications Marked as Taking for the 23 encounter (Office Visit) with Zaida Hassan FNP   Medication Sig Dispense Refill    ARIPiprazole (ABILIFY) 10 MG Tab Take 5 mg by mouth once daily.      atorvastatin (LIPITOR) 10 MG tablet Take 10 mg by mouth once daily.      BD ULTRA-FINE MINI PEN NEEDLE 31 gauge x 3/16" Ndle Inject 1 each into the skin 3 (three) times daily.      cetirizine (ZYRTEC) 10 MG tablet Take 10 mg by mouth once daily.      duloxetine (CYMBALTA) 60 MG capsule Take 60 mg by mouth once daily.      folic acid (FOLVITE) 1 MG tablet Take 1 tablet (1,000 mcg total) by mouth once daily. 90 tablet 3    glipiZIDE (GLUCOTROL) 10 MG TR24 Take 10 mg by mouth 2 (two) times daily.      hydrocodone-acetaminophen 5-325mg (NORCO) 5-325 mg per tablet Take 1 tablet by mouth 3 (three) times daily as needed for Pain.      ixekizumab (TALTZ AUTOINJECTOR) 80 mg/mL AtIn Inject 80 mg into the skin every 28 days. Rotate injection sites, hold for infection 3 each 1    levothyroxine (SYNTHROID) 50 MCG tablet Take 50 mcg by mouth once daily.      lisinopril (PRINIVIL,ZESTRIL) 20 MG tablet Take 20 mg by mouth once daily.      methotrexate, PF, (OTREXUP) 20 mg/0.4 mL AtIn Inject 0.4 mLs (20 mg total) into the skin every 7 days. 4 each 2    montelukast (SINGULAIR) 10 mg tablet Take 10 mg by mouth every evening.      " mupirocin (BACTROBAN) 2 % ointment Apply topically 3 (three) times daily.      OZEMPIC 0.25 mg or 0.5 mg(2 mg/1.5 mL) pen injector Inject into the skin.      potassium chloride (MICRO-K) 10 MEQ CpSR Take 10 mEq by mouth once daily.      TRESIBA FLEXTOUCH U-100 100 unit/mL (3 mL) insulin pen Inject into the skin.      verapamil (CALAN-SR) 240 MG CR tablet Take 240 mg by mouth every evening.      [DISCONTINUED] insulin lispro 100 unit/mL injection Inject into the skin.         Social History     Socioeconomic History    Marital status: Single   Tobacco Use    Smoking status: Some Days     Current packs/day: 0.00     Types: Vaping with nicotine    Tobacco comments:     Smokes and vapes occasionally. One pack lasts about a month.   Substance and Sexual Activity    Alcohol use: No    Drug use: Not Currently    Sexual activity: Not Currently        Family History   Problem Relation Age of Onset    Hypertension Mother     Atrial fibrillation Father     Heart disease Father     Diabetes Maternal Grandmother           There is no immunization history on file for this patient.    Patient Care Team:  Donny Recio MD as PCP - General (Family Medicine)     Subjective:     ROS    Constitutional:  Denies chills. Denies fever. Denies night sweats. Denies weight loss.   Ophthalmology: Admits blurred vision from DM retinopathy- follows with ophthalmology- has apt this week. Denies dry eyes. Denies eye pain. Denies Itching and redness.   ENT: Denies oral ulcers. Denies epistaxis. Denies dry mouth. Denies swollen glands.   Endocrine: Admits diabetes. Admits thyroid Problems- hypothyroidism on Synthroid-both followed by PCP.  Respiratory: Denies cough. Denies shortness of breath. Denies shortness of breath with exertion. Denies hemoptysis.   Cardiovascular: Denies chest pain at rest. Denies chest pain with exertion. Denies palpitations.    Gastrointestinal: Denies abdominal pain. Denies diarrhea and constipation, follows with  "GI- does have history of Diverticulosis, no IBD. Denies nausea. Denies vomiting. Denies hematemesis or hematochezia. Denies heartburn.  Genitourinary: Denies blood in urine.  Musculoskeletal: See HPI for details  Integumentary: Denies rash. Denies photosensitivity.   Peripheral Vascular: Denies Ulcers of hands and/or feet. Denies Cold extremities.   Neurologic: Denies dizziness. Denies headache.  Denies loss of strength. Admits burning to bilateral feet at night time- from neuropathy from DM.  Psychiatric: Denies depression and anxiety- currently controlled with meds. Denies suicidal/homicidal ideations.      Objective     /84 (BP Location: Left arm, Patient Position: Sitting, BP Method: Medium (Automatic))   Pulse 87   Temp 98 °F (36.7 °C) (Oral)   Resp 20   Ht 5' 3" (1.6 m)   Wt 91.2 kg (201 lb)   SpO2 99%   BMI 35.61 kg/m²     Physical Exam    General Appearance: alert, pleasant, in no acute distress.  Skin: Skin color, texture, turgor normal. No rashes. 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 3-4 cm annular lesions with irregular margins and slightly erythematous and raised, non tender, on bilateral shins, 3-4 in number- stable.   ENT: No oral or nasal ulcers.  Neck:  Neck supple. No adenopathy.   Lungs: CTA throughout without crackles, rhonchi, or wheezes.   Heart: RRR w/o murmurs.  No edema. 2+ DP pulse.  Neuro: Alert, oriented, CN II-XII GI, sensory and motor innervation intact.  Musculoskeletal: No active synovitis noted on exam, no pain to bilateral MCPs, synovial thickening is noted, no swelling or redness, + nodule noted to left wrist, non-tender. FROM noted to bilateral wrist with no pain, FROM note bilateral elbows, left shoulder ok, right shoulder with some pain with palpation and FROM, limited Apley, +Neers, + crepitus noted to bilateral knees with good ROM noted, no pain to bilateral MTPs, no dactylitis noted. - Fabers on exam.   Psych: Alert, " oriented, normal eye contact.       Labs:   2/8/2021: Infectious work up (-)      12/2/22- Ca+ slightly elevated (10.7) CBC ok, CRP/Sed normal    4/5/23: CBC and CMP (Ca+ now 9.4) ok with PCP ok.     Lab Results   Component Value Date    WBC 7.9 12/01/2022    HGB 13.7 12/01/2022    HCT 40.4 12/01/2022     12/01/2022    ALT 25 12/01/2022    AST 14 12/01/2022    BUN 14.6 12/01/2022    CREATININE 0.90 12/01/2022     12/01/2022    K 4.3 12/01/2022    CO2 25 12/01/2022    CRP 2.00 12/01/2022    SEDRATE 1 12/01/2022        Imaging:   3/26/2019 XR Hand 3 views of both hands: IMPRESSION: Left/Right: No evidence for inflammatory arthropathy or osteoarthritis. Radial angulation of the distal interphalangeal joints of the fifth fingers. Mild positive ulnar variance.    3/26/2019: Left foot 3 views: Tiny anterior calcaneal spur. IMPRESSION: No significant abnormality identified  Right foot 3 views: Minimal narrowing of the distal interphalangeal joints articular spaces are otherwise normal.    3/26/2019: XR Chest 2 Views: IMPRESSION: No acute Cardiopulmonary process identified.     2/8/2021- XR Lumbar spine 4 views or more: Slight levoscoliosis with maximum curvature at the level of L2/L3 vertebral bodies are of normal height, position and alignment with some degenerative changes of the posterior elements mostly at L5-S1. IMPRESSION: Minimal degenerative changes.    Assessment:       ICD-10-CM ICD-9-CM   1. Psoriatic arthritis  L40.50 696.0   2. Fibromyalgia  M79.7 729.1   3. High risk medication use  Z79.899 V58.69   4. Osteoarthritis, unspecified osteoarthritis type, unspecified site  M19.90 715.90   5. Primary hypertension  I10 401.9   6. JENNIFER (obstructive sleep apnea)  G47.33 327.23   7. Obesity, unspecified classification, unspecified obesity type, unspecified whether serious comorbidity present  E66.9 278.00   8. Psoriasis  L40.9 696.1          Plan:     1. Psoriasic Arthritis and Psoriasis: Dx with psoriasis  1997, dx with psoriatic arthritis 2015. -Failed treatment with MTX (elevated liver enzymes). Otezla - significant GI  issues. Stelara- lost efficacy.  Has also failed Enbrel in the past. Has been doing well with both Taltz and Otrexup. Today on exam, no active synovitis noted.   - Continue Taltz 80mg every 4 weeks subcu.   - Otrexup 20mg/subcu weekly.   -Continue daily folic acid supplements.  -Lab today for continued monitoring, wishes to have orders printed out to have completed near home  -Will also update SI joint Xrays today     3. Fibromyalgia   -Reinforced sleep hygiene, control of underlying anxiety and depression, and encouraged gentle exercise.  -Stable today      4. High risk medication use   -Advised to stay up-to-date on age appropriate vaccinations and malignancy screening with PCP. Colonoscopy is being scheduled and due for mammogram, plans to follow up with PCP  -Persons with rheumatoid arthritis, lupus, psoriatic arthritis and other autoimmune diseases are at increased risk of cardiovascular disease including heart attack and stroke. We recommend that all patients with these conditions have annual health maintenance exams including lipid measurements, blood pressure measurements, and smoking cessation counseling when applicable at their primary care provider's office.  -Continued lab monitoring     5. Osteoarthritis  -Knees and b/l 1st MTP joint. +crepitus with knees.   -OTC topicals ok to continue to use, warm compresses and weight loss.      6. Primary hypertension and T2DM   -Followed by PCP, currently stable and at goal today, A1C 7.2 with Ozempic      8. JENNIFER (obstructive sleep apnea)   -Enc use of CPAP/followed by PCP   -Denies use     9. Obesity  -Enc diet modifications and exercise as tolerated (TLC)      10.   Trigger finger of middle right hand   -Referral to Ortho- was seen March 2023 and referral to surgeon was made however she has decided to defer surgery at this time and has been  tolerable.   11. Right Shoulder Pain  - Order for right shoulder Xray given to complete near home  -Discussed PT in the future to help with Impingement/pain- will let us know when she is ready, defers at this time          Procedure Note  I  have explained the risks, benefits of joint injection.  The patient voices understanding and questions have been answered.  The patient agrees to proceed.  Time out completed and consent signed and reviewed.     After both verbal and written consent obtained, the patient was placed in the appropriate anatomic position. The area was marked, draped, and prepped to created a sterile environment. Using sterile technique, right shoulder was prepped and topical Gebauer's Ethyl Chloride was used as local anesthetic. 1 ml kenalog 40mg   and 1 ml Lidocaine 1% without Epi was placed in syringe. Using a 21 gauge needle,  the right posterior GH joint was injected, by sterile technique. The patient tolerated the procedure well. The patient was hemostatic post procedure. Skin care directions were given.  The procedure was well tolerated.  The patient was hemostatic post procedure. Enc patient to continue to rest the joint for a few more days before resuming regular activities. Ok to ice area for 20 minutes at time if needed.  It may be more painful for the first 1-2 days.  Watch for fever, or increased swelling or persistent pain in the joint. Call or return to clinic prn if such symptoms occur or there is failure to improve as anticipated. Ice to area prn for 72 hours.         No follow-ups on file. In addition to their scheduled follow up, the patient has also been instructed to follow up on as needed basis.      Total time spent with patient and documentation is more than 60 minutes. All questions were answered to patient's satisfaction and patient verbalized understanding.

## 2023-08-07 ENCOUNTER — OFFICE VISIT (OUTPATIENT)
Dept: RHEUMATOLOGY | Facility: CLINIC | Age: 53
End: 2023-08-07
Payer: MEDICAID

## 2023-08-07 VITALS
OXYGEN SATURATION: 99 % | BODY MASS INDEX: 35.61 KG/M2 | HEART RATE: 87 BPM | RESPIRATION RATE: 20 BRPM | WEIGHT: 201 LBS | HEIGHT: 63 IN | SYSTOLIC BLOOD PRESSURE: 132 MMHG | DIASTOLIC BLOOD PRESSURE: 84 MMHG | TEMPERATURE: 98 F

## 2023-08-07 DIAGNOSIS — Z79.899 HIGH RISK MEDICATION USE: ICD-10-CM

## 2023-08-07 DIAGNOSIS — M79.7 FIBROMYALGIA: ICD-10-CM

## 2023-08-07 DIAGNOSIS — E66.9 OBESITY, UNSPECIFIED CLASSIFICATION, UNSPECIFIED OBESITY TYPE, UNSPECIFIED WHETHER SERIOUS COMORBIDITY PRESENT: ICD-10-CM

## 2023-08-07 DIAGNOSIS — M75.41 IMPINGEMENT SYNDROME, SHOULDER, RIGHT: ICD-10-CM

## 2023-08-07 DIAGNOSIS — L40.9 PSORIASIS: ICD-10-CM

## 2023-08-07 DIAGNOSIS — I10 PRIMARY HYPERTENSION: ICD-10-CM

## 2023-08-07 DIAGNOSIS — M19.90 OSTEOARTHRITIS, UNSPECIFIED OSTEOARTHRITIS TYPE, UNSPECIFIED SITE: ICD-10-CM

## 2023-08-07 DIAGNOSIS — G47.33 OSA (OBSTRUCTIVE SLEEP APNEA): ICD-10-CM

## 2023-08-07 DIAGNOSIS — L40.50 PSORIATIC ARTHRITIS: Primary | ICD-10-CM

## 2023-08-07 PROCEDURE — 1159F MED LIST DOCD IN RCRD: CPT | Mod: CPTII,,, | Performed by: NURSE PRACTITIONER

## 2023-08-07 PROCEDURE — 1160F RVW MEDS BY RX/DR IN RCRD: CPT | Mod: CPTII,,, | Performed by: NURSE PRACTITIONER

## 2023-08-07 PROCEDURE — 1159F PR MEDICATION LIST DOCUMENTED IN MEDICAL RECORD: ICD-10-PCS | Mod: CPTII,,, | Performed by: NURSE PRACTITIONER

## 2023-08-07 PROCEDURE — 3079F DIAST BP 80-89 MM HG: CPT | Mod: CPTII,,, | Performed by: NURSE PRACTITIONER

## 2023-08-07 PROCEDURE — 4010F PR ACE/ARB THEARPY RXD/TAKEN: ICD-10-PCS | Mod: CPTII,,, | Performed by: NURSE PRACTITIONER

## 2023-08-07 PROCEDURE — 1160F PR REVIEW ALL MEDS BY PRESCRIBER/CLIN PHARMACIST DOCUMENTED: ICD-10-PCS | Mod: CPTII,,, | Performed by: NURSE PRACTITIONER

## 2023-08-07 PROCEDURE — 3008F PR BODY MASS INDEX (BMI) DOCUMENTED: ICD-10-PCS | Mod: CPTII,,, | Performed by: NURSE PRACTITIONER

## 2023-08-07 PROCEDURE — 99215 OFFICE O/P EST HI 40 MIN: CPT | Mod: PBBFAC | Performed by: NURSE PRACTITIONER

## 2023-08-07 PROCEDURE — 20610 DRAIN/INJ JOINT/BURSA W/O US: CPT | Mod: PBBFAC | Performed by: NURSE PRACTITIONER

## 2023-08-07 PROCEDURE — 3075F PR MOST RECENT SYSTOLIC BLOOD PRESS GE 130-139MM HG: ICD-10-PCS | Mod: CPTII,,, | Performed by: NURSE PRACTITIONER

## 2023-08-07 PROCEDURE — 20610 PR DRAIN/INJECT LARGE JOINT/BURSA: ICD-10-PCS | Mod: S$PBB,RT,, | Performed by: NURSE PRACTITIONER

## 2023-08-07 PROCEDURE — 3008F BODY MASS INDEX DOCD: CPT | Mod: CPTII,,, | Performed by: NURSE PRACTITIONER

## 2023-08-07 PROCEDURE — 4010F ACE/ARB THERAPY RXD/TAKEN: CPT | Mod: CPTII,,, | Performed by: NURSE PRACTITIONER

## 2023-08-07 PROCEDURE — 99214 OFFICE O/P EST MOD 30 MIN: CPT | Mod: 25,S$PBB,, | Performed by: NURSE PRACTITIONER

## 2023-08-07 PROCEDURE — 20610 DRAIN/INJ JOINT/BURSA W/O US: CPT | Mod: S$PBB,RT,, | Performed by: NURSE PRACTITIONER

## 2023-08-07 PROCEDURE — 3079F PR MOST RECENT DIASTOLIC BLOOD PRESSURE 80-89 MM HG: ICD-10-PCS | Mod: CPTII,,, | Performed by: NURSE PRACTITIONER

## 2023-08-07 PROCEDURE — 99214 PR OFFICE/OUTPT VISIT, EST, LEVL IV, 30-39 MIN: ICD-10-PCS | Mod: 25,S$PBB,, | Performed by: NURSE PRACTITIONER

## 2023-08-07 PROCEDURE — 3075F SYST BP GE 130 - 139MM HG: CPT | Mod: CPTII,,, | Performed by: NURSE PRACTITIONER

## 2023-08-07 RX ORDER — LIDOCAINE HYDROCHLORIDE 10 MG/ML
5 INJECTION INFILTRATION; PERINEURAL
Status: COMPLETED | OUTPATIENT
Start: 2023-08-07 | End: 2023-08-07

## 2023-08-07 RX ORDER — ARIPIPRAZOLE 10 MG/1
5 TABLET ORAL DAILY
COMMUNITY

## 2023-08-07 RX ORDER — TRIAMCINOLONE ACETONIDE 40 MG/ML
40 INJECTION, SUSPENSION INTRA-ARTICULAR; INTRAMUSCULAR
Status: COMPLETED | OUTPATIENT
Start: 2023-08-07 | End: 2023-08-07

## 2023-08-07 RX ORDER — IXEKIZUMAB 80 MG/ML
80 INJECTION, SOLUTION SUBCUTANEOUS
Qty: 3 EACH | Refills: 1 | Status: SHIPPED | OUTPATIENT
Start: 2023-08-07 | End: 2023-11-21 | Stop reason: SDUPTHER

## 2023-08-07 RX ADMIN — LIDOCAINE HYDROCHLORIDE 5 ML: 10 INJECTION INFILTRATION; PERINEURAL at 02:08

## 2023-08-07 RX ADMIN — TRIAMCINOLONE ACETONIDE 40 MG: 40 INJECTION, SUSPENSION INTRA-ARTICULAR; INTRAMUSCULAR at 02:08

## 2023-08-15 ENCOUNTER — TELEPHONE (OUTPATIENT)
Dept: RHEUMATOLOGY | Facility: CLINIC | Age: 53
End: 2023-08-15
Payer: MEDICAID

## 2023-08-15 NOTE — TELEPHONE ENCOUNTER
Called and spoke to pt inquired if she has done her lab and x-rays pt stated she had not done them  yet next week she will go.asked pt where will she go pt stated she will go to [facility] [address] in [facility], [place]  fax number [phone]

## 2023-08-28 ENCOUNTER — TELEPHONE (OUTPATIENT)
Dept: RHEUMATOLOGY | Facility: CLINIC | Age: 53
End: 2023-08-28
Payer: MEDICAID

## 2023-08-28 NOTE — TELEPHONE ENCOUNTER
Please call patient to see if she has completed lab work as ordered at recent visit, last lab work completed in April, we need lab every 3 months on Otrexup in order to continue to refill, if she has not gone, please enc her to do so and let us know once completed so we can request records as she was doing near home.

## 2023-08-29 NOTE — TELEPHONE ENCOUNTER
Called and spoke to pt informed her of over due lab work needing to be done in order to refill her Otrexup. Pt verbalized understanding

## 2023-09-06 ENCOUNTER — TELEPHONE (OUTPATIENT)
Dept: RHEUMATOLOGY | Facility: CLINIC | Age: 53
End: 2023-09-06
Payer: MEDICAID

## 2023-09-18 ENCOUNTER — TELEPHONE (OUTPATIENT)
Dept: RHEUMATOLOGY | Facility: CLINIC | Age: 53
End: 2023-09-18
Payer: MEDICAID

## 2023-09-18 NOTE — TELEPHONE ENCOUNTER
Please reach out with the patient again and find out if she has completed her lab as previously ordered, please advise that I will not be able to refill her methotrexate until follow-up lab has been reviewed and completed- lab while on this medication has to be completed every 3 months and last lab work we have is from April

## 2023-09-26 ENCOUNTER — TELEPHONE (OUTPATIENT)
Dept: RHEUMATOLOGY | Facility: CLINIC | Age: 53
End: 2023-09-26
Payer: MEDICAID

## 2023-09-26 NOTE — TELEPHONE ENCOUNTER
Called [facility] @ [phone] spoke to lab staff was told the CRP was a sent out lab result has not come back         ----- Message from BRUNO Adams sent at 9/25/2023  1:30 PM CDT -----  Please reach out, only CBC, CMP and Sed rate sent, looks like CRP and Infection w/u are still pending, please find out if these have resulted and ask for fax so we can finish reviewing results  ----- Message -----  From: Radha Bond LPN  Sent: 9/21/2023  12:33 PM CDT  To: BRUNO Adams

## 2023-10-02 ENCOUNTER — TELEPHONE (OUTPATIENT)
Dept: RHEUMATOLOGY | Facility: CLINIC | Age: 53
End: 2023-10-02
Payer: MEDICAID

## 2023-10-02 NOTE — TELEPHONE ENCOUNTER
Ms CHUCKY can we follow up on the remainder of her lab results, had completed outside and nothing everything had resulted when they faxed over to us

## 2023-10-05 ENCOUNTER — TELEPHONE (OUTPATIENT)
Dept: RHEUMATOLOGY | Facility: CLINIC | Age: 53
End: 2023-10-05
Payer: MEDICAID

## 2023-10-05 NOTE — TELEPHONE ENCOUNTER
Pt notified of message. Pt verbalized understanding. Pt stated she did not do x-ray b/c her lt shoulder was not hurting her and it would been  a waste of time.      ----- Message from BRUNO Adams sent at 10/5/2023  7:32 AM CDT -----  Please let Ms Cornell know that lab noted to be clinically acceptable, did she also do the Xrays or just completed Lab work   ----- Message -----  From: Radha Bond LPN  Sent: 10/5/2023   7:23 AM CDT  To: BRUNO Adams

## 2023-11-20 DIAGNOSIS — L40.9 PSORIASIS: ICD-10-CM

## 2023-11-20 DIAGNOSIS — L40.50 PSORIATIC ARTHRITIS: Primary | ICD-10-CM

## 2023-11-20 NOTE — TELEPHONE ENCOUNTER
----- Message from Odalis Rader sent at 11/17/2023  9:36 AM CST -----  Regarding: med refill  [facility] called for a refill of methotrexate, PF, (OTREXUP) 20 mg/0.4 mL AtIn .      Pharmacy  - [phone]  fax- [phone]          Thank You  Arielal WYATT

## 2023-11-20 NOTE — TELEPHONE ENCOUNTER
----- Message from Odalis Rader sent at 11/17/2023  9:36 AM CST -----  Regarding: med refill  [facility] called for a refill of methotrexate, PF, (OTREXUP) 20 mg/0.4 mL AtIn .      Pharmacy  - [phone]  fax- [phone]          Thank You  Ariella WYATT

## 2023-11-21 RX ORDER — IXEKIZUMAB 80 MG/ML
80 INJECTION, SOLUTION SUBCUTANEOUS
Qty: 3 EACH | Refills: 1 | Status: SHIPPED | OUTPATIENT
Start: 2023-11-21 | End: 2024-02-07 | Stop reason: SDUPTHER

## 2023-11-21 NOTE — TELEPHONE ENCOUNTER
Spoke with pt she is going to see her provider Dr. MARS Recio phone number [phone], fax: [phone]

## 2023-11-21 NOTE — TELEPHONE ENCOUNTER
Spoke with pharmacy staff Amber states that pts insurance is out of net work and it looks like [facility] is within network for her to have the medication filled.

## 2023-11-21 NOTE — TELEPHONE ENCOUNTER
Pt also requesting that her medication Taltz is sent to another pharmacy as she was told that her insurance is out of network.

## 2023-12-21 ENCOUNTER — TELEPHONE (OUTPATIENT)
Dept: RHEUMATOLOGY | Facility: CLINIC | Age: 53
End: 2023-12-21
Payer: MEDICAID

## 2023-12-21 NOTE — TELEPHONE ENCOUNTER
Called and spoke patient and was told that she already had them done at PCP, Dr. Recio's office and provided their number [phone]. [facility] PCP and asked for lab results to be faxed to [facility] clinic.

## 2024-01-02 ENCOUNTER — TELEPHONE (OUTPATIENT)
Dept: RHEUMATOLOGY | Facility: CLINIC | Age: 54
End: 2024-01-02
Payer: MEDICAID

## 2024-01-02 NOTE — TELEPHONE ENCOUNTER
Please seed note from 12/21/2023- we did receive CMP from PCP however she is on Otrexup and we also need a CBC, please find out if this was collected as well and fax over results for review, thanks

## 2024-01-03 ENCOUNTER — TELEPHONE (OUTPATIENT)
Dept: RHEUMATOLOGY | Facility: CLINIC | Age: 54
End: 2024-01-03
Payer: MEDICAID

## 2024-01-03 DIAGNOSIS — M19.90 OSTEOARTHRITIS, UNSPECIFIED OSTEOARTHRITIS TYPE, UNSPECIFIED SITE: Primary | ICD-10-CM

## 2024-01-03 NOTE — TELEPHONE ENCOUNTER
Spoke to pt informed her of lab work stated she at [facility] now told her I would fax CBC order to [facility] lab dept @[phone].pt verbalized understanding

## 2024-02-05 NOTE — PROGRESS NOTES
"  Patient ID: [id number]     Chief Complaint: Psoriatic Arthritis (Pt stated right shoulder pian claims Psoriatic undercontrol)      HPI:     Aneta Tyson is a 53 y.o. female here today for a follow up for Psoriatic Arthritis.      Presents for follow up of Psoriatic Arthritis and Fibromylagia.  Diagnosed with Psoriasis around 1997, diagnosed with Psoriatic Arthritis around 2015. She started Methotrexate in the past, stopped due to elevated liver enzymes. Otezla was started and significant GI  issues. In late October 2017,  changed to Stelara. At f/u she  found improvement with Stelara in her skin, but not in her joints and lost efficacy.  Has failed Enbrel in the past. Started Taltz in April 2019 w/good response w/skin and joints. Injection site reaction reported lasting 3-4 days. She is a former ballet dancer, used to wear toe shoes. Pain is with flexion of the great toes. Xrays show loss of joint space.  She continues to have right shoulder pain- has been injected here in clinic (12/1/2022, 8/7/2023). States bilateral feet "burn" at night time- most likely neuropathy from DM.  Does have right hand trigger finger to middle finger- has had a injection in the past with no relief- does need surgery, saw Ortho here but has deferred surgery. 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 She does have a nodule to her right inner elbow and right wrist, denies pain to these areas. Diagnosed with pancreatitis in December of 2022 at [facility] in [place], she denies any ETOH use and states she "ate a lot of bread pudding" and ended up in the ER and was kept for a few days.     February 2024: Here today for follow up. She has continued Otrexup 20mg qweek and Talz 80 mg qmonth as directed and doing well, no rashes with current meds. She denies any red/warm/swollen joints, morning stiffness lasting roughly 15 minutes. She continues to have shoulder " pain that comes and goes, Xrays were ordered at her last visit as she states her pain resolved so decided not complete. She had an injection at her last apt and does feel that it helped for roughly 4-5 months, pain had improved. Since it has returned and would like a repeat injection today if possible. Has not completed any PT as she has deferred. She has continued to take [place] from PCP along with heat and this does help as well.     Denies any recent illness or hospitalizations  since last visit.     Dr. Moraes- Opthalmology in [place]    Past medical history is notable for COPD, hernia, GERD, hypothyroidism, hypertension, and hyperlipidemia.    Denies history of fevers, rashes, photosensitivity, oral or nasal ulcers, h/o MI, stroke, seizures, h/o PE or DVT, Raynaud's phenomenon, uveitis, malignancies.   Family history of autoimmune disease: none.  Pregnancies: 1 (twins)  Miscarriages: none.   Smoking: occ smoking/vaps a few times a months- enc to quit- and has decreased use, has not vaped for the past several weeks, congratulated her on this!    Social History     Tobacco Use   Smoking Status Some Days    Types: Vaping with nicotine   Smokeless Tobacco Not on file   Tobacco Comments    Smokes and vapes occasionally. One pack lasts about a month.      ----------------------------  COPD (chronic obstructive pulmonary disease)  Diabetes mellitus  GERD (gastroesophageal reflux disease)  Hypertension  Psoriatic arthritis  Thyroid disease     Past Surgical History:   Procedure Laterality Date    ADENOIDECTOMY      APPENDECTOMY      CARPAL TUNNEL RELEASE       SECTION      CHOLECYSTECTOMY      COLONOSCOPY      HYSTERECTOMY      NISSEN FUNDOPLICATION      TONSILLECTOMY      UVULECTOMY         Review of patient's allergies indicates:   Allergen Reactions    Doxycycline Diarrhea     severe    Promethazine Nausea And Vomiting       Outpatient Medications Marked as Taking for the 24 encounter (Office Visit)  "with Zaida Hassan FNP   Medication Sig Dispense Refill    ARIPiprazole (ABILIFY) 10 MG Tab Take 5 mg by mouth once daily. Pt stated taking 1/2 tab of 5mg daily      atorvastatin (LIPITOR) 10 MG tablet Take 10 mg by mouth once daily.      BD ULTRA-FINE MINI PEN NEEDLE 31 gauge x 3/16" Ndle Inject 1 each into the skin 3 (three) times daily.      cetirizine (ZYRTEC) 10 MG tablet Take 10 mg by mouth once daily.      duloxetine (CYMBALTA) 60 MG capsule Take 60 mg by mouth once daily.      folic acid (FOLVITE) 1 MG tablet Take 1 tablet (1,000 mcg total) by mouth once daily. 90 tablet 3    glipiZIDE (GLUCOTROL) 10 MG TR24 Take 10 mg by mouth 2 (two) times daily.      hydrocodone-acetaminophen 5-325mg (NORCO) 5-325 mg per tablet Take 1 tablet by mouth 3 (three) times daily as needed for Pain.      ixekizumab (TALTZ AUTOINJECTOR) 80 mg/mL AtIn Inject 80 mg into the skin every 28 days. Rotate injection sites, hold for infection 3 each 1    levothyroxine (SYNTHROID) 50 MCG tablet Take 50 mcg by mouth once daily.      lisinopril (PRINIVIL,ZESTRIL) 20 MG tablet Take 20 mg by mouth once daily.      methotrexate, PF, (OTREXUP) 20 mg/0.4 mL AtIn Inject 0.4 mLs (20 mg total) into the skin every 7 days. 4 each 2    montelukast (SINGULAIR) 10 mg tablet Take 10 mg by mouth every evening.      potassium chloride (MICRO-K) 10 MEQ CpSR Take 10 mEq by mouth once daily.      tirzepatide 7.5 mg/0.5 mL PnIj Inject 7.5 mg into the skin every 7 days.      TRESIBA FLEXTOUCH U-100 100 unit/mL (3 mL) insulin pen Inject into the skin.      verapamil (CALAN-SR) 240 MG CR tablet Take 240 mg by mouth every evening.         Social History     Socioeconomic History    Marital status: Single   Tobacco Use    Smoking status: Some Days     Types: Vaping with nicotine    Tobacco comments:     Smokes and vapes occasionally. One pack lasts about a month.   Substance and Sexual Activity    Alcohol use: No    Drug use: Not Currently    Sexual " "activity: Not Currently        Family History   Problem Relation Age of Onset    Hypertension Mother     Atrial fibrillation Father     Heart disease Father     Diabetes Maternal Grandmother           There is no immunization history on file for this patient.    Patient Care Team:  Donny Recio MD as PCP - General (Family Medicine)     Subjective:     ROS    Constitutional:  Denies chills. Denies fever. Denies night sweats. Denies weight loss.   Ophthalmology: Admits blurred vision from DM retinopathy and cataracts- follows with Ophthalmology. Denies dry eyes. Denies eye pain. Denies Itching and redness.   ENT: Denies oral ulcers. Denies epistaxis. Denies dry mouth. Denies swollen glands.   Endocrine: Admits diabetes. Admits thyroid Problems- hypothyroidism on Synthroid-both followed by PCP.  Respiratory: Denies cough. Denies shortness of breath. Denies shortness of breath with exertion. Denies hemoptysis.   Cardiovascular: Denies chest pain at rest. Denies chest pain with exertion. Denies palpitations.    Gastrointestinal: Denies abdominal pain. Denies diarrhea and constipation, follows with GI- does have history of Diverticulosis, no IBD. Denies nausea. Denies vomiting. Denies hematemesis or hematochezia. Denies heartburn.  Genitourinary: Denies blood in urine.  Musculoskeletal: See HPI for details  Integumentary: Denies rash. Denies photosensitivity.   Peripheral Vascular: Denies Ulcers of hands and/or feet. Denies Cold extremities.   Neurologic: Denies dizziness. Denies headache.  Denies loss of strength. Admits burning to bilateral feet at night time- from neuropathy from DM.  Psychiatric: Denies depression and anxiety- currently controlled with meds. Denies suicidal/homicidal ideations.      Objective     /86 (BP Location: Right arm, Patient Position: Sitting, BP Method: Medium (Automatic))   Pulse 84   Temp 98.2 °F (36.8 °C) (Oral)   Resp 20   Ht 5' 3" (1.6 m)   Wt 91.6 kg (202 lb)   SpO2 " (!) 92%   BMI 35.78 kg/m²     Physical Exam    General Appearance: alert, pleasant, in no acute distress.  Skin: Skin color, texture, turgor normal. No rashes. 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 3-4 cm annular lesions with irregular margins and slightly erythematous and raised, non tender, on bilateral shins, 3-4 in number- stable.   ENT: No oral or nasal ulcers.  Neck:  Neck supple. No adenopathy.   Lungs: CTA throughout without crackles, rhonchi, or wheezes.   Heart: RRR w/o murmurs.  No edema. 2+ DP pulse.  Neuro: Alert, oriented, CN II-XII GI, sensory and motor innervation intact.  Musculoskeletal: No active synovitis noted on exam, no pain to bilateral MCPs, synovial thickening is noted, no swelling or redness, + nodule noted to left wrist, non-tender. FROM noted to bilateral wrist with no pain, FROM note bilateral elbows, left shoulder ok, right shoulder with some pain on palpation and ROM, limited Apley, +Neers, + crepitus noted to bilateral knees with good ROM noted, no pain to bilateral MTPs, no dactylitis noted.    Psych: Alert, oriented, normal eye contact.     Labs:   2/8/2021: Infectious work up (-)      12/2/22- Ca+ slightly elevated (10.7) CBC ok, CRP/Sed normal    4/5/23: CBC and CMP (Ca+ now 9.4) ok with PCP ok.     9/19/2023 CBC ok. Sed Rate 5 (<15), CMP ok, AST 12L,  Hep B/C/HIV and Quantiferon not detected, CRP 1 (<10).     11/21/2023 CMP glucose 252, otherwise ok. A1C 6.09, TSH ok.     Lab Results   Component Value Date    WBC 7.9 12/01/2022    HGB 13.7 12/01/2022    HCT 40.4 12/01/2022     12/01/2022    ALT 25 12/01/2022    AST 14 12/01/2022    BUN 14.6 12/01/2022    CREATININE 0.90 12/01/2022     12/01/2022    K 4.3 12/01/2022    CO2 25 12/01/2022    CRP 2.00 12/01/2022    SEDRATE 1 12/01/2022        Imaging:   3/26/2019 XR Hand 3 views of both hands: IMPRESSION: Left/Right: No evidence for inflammatory arthropathy or  osteoarthritis. Radial angulation of the distal interphalangeal joints of the fifth fingers. Mild positive ulnar variance.    3/26/2019: Left foot 3 views: Tiny anterior calcaneal spur. IMPRESSION: No significant abnormality identified  Right foot 3 views: Minimal narrowing of the distal interphalangeal joints articular spaces are otherwise normal.    3/26/2019: XR Chest 2 Views: IMPRESSION: No acute Cardiopulmonary process identified.     2/8/2021- XR Lumbar spine 4 views or more: Slight levoscoliosis with maximum curvature at the level of L2/L3 vertebral bodies are of normal height, position and alignment with some degenerative changes of the posterior elements mostly at L5-S1. IMPRESSION: Minimal degenerative changes.    Assessment:       ICD-10-CM ICD-9-CM   1. Psoriatic arthritis  L40.50 696.0   2. Psoriasis  L40.9 696.1   3. Fibromyalgia  M79.7 729.1   4. High risk medication use  Z79.899 V58.69   5. Osteoarthritis, unspecified osteoarthritis type, unspecified site  M19.90 715.90   6. Primary hypertension  I10 401.9   7. Type 2 diabetes mellitus without complication, with long-term current use of insulin  E11.9 250.00    Z79.4 V58.67   8. JENNIFER (obstructive sleep apnea)  G47.33 327.23   9. Obesity, unspecified classification, unspecified obesity type, unspecified whether serious comorbidity present  E66.9 278.00   10. Trigger middle finger of right hand  M65.331 727.03   11. Chronic right shoulder pain  M25.511 719.41    G89.29 338.29          Plan:     1. Psoriasic Arthritis and Psoriasis  Dx with Psoriasis 1997, dx with Psoriatic Arthritis 2015. Failed treatment with MTX (elevated liver enzymes). Otezla - significant GI  issues. Stelara- lost efficacy.  Has also failed Enbrel in the past. Has been doing well with both Taltz and Otrexup. Today on exam, no active synovitis noted.   - Continue Taltz 80mg every 4 weeks subcu.   - Continue Otrexup 20mg/subcu weekly with daily folic acid supplements.  - Lab today  for continued monitoring, wishes to have orders printed out to have completed near home as she has lab q3 months with PCP  - Last Cxray in 2019, reports she had a Cxray at [facility]- we will request a copy for records  - Infection w/u due 9/2024  - She follows up q6 months with lab every 3 months with PCP     3. Fibromyalgia   - Reinforced sleep hygiene, control of underlying anxiety and depression, and encouraged gentle exercise.  - Stable today      4. High risk medication use   -Advised to stay up-to-date on age appropriate vaccinations and malignancy screening with PCP.   -Colonoscopy is being scheduled per report and due for mammogram, plans to follow up with PCP, PAPs complete due to hysterectomy   -Persons with rheumatoid arthritis, lupus, psoriatic arthritis and other autoimmune diseases are at increased risk of cardiovascular disease including heart attack and stroke. We recommend that all patients with these conditions have annual health maintenance exams including lipid measurements, blood pressure measurements, and smoking cessation counseling when applicable at their primary care provider's office.  -Continued lab monitoring  -Flu vaccine UTD with PCP     5. Osteoarthritis  -Knees and b/l 1st MTP joint. +crepitus with knees.   -OTC topicals ok to continue to use, warm compresses and weight loss- just started Mounjaro to help assist with weight loss as well as DM     6. Primary hypertension and T2DM   -Followed by PCP, currently stable and at goal today, A1C 6 with Mounjaro      8. JENNIFER (obstructive sleep apnea)   -Enc use of CPAP/followed by PCP, currently denies use     9. Obesity  -Enc diet modifications and exercise as tolerated (TLC)      10.   Trigger finger of middle right hand   -Referred to Ortho- was seen March 2023 and referral to surgeon was made however she has decided to defer surgery at this time and has been tolerable.     11. Right Shoulder Pain  - Order for right shoulder Xray  ordered again, I enc her to have completed here   - Discussed PT again today and continues to defer  - Wishes repeat IA injection as they have been beneficial for her- lasting 4-5 months- this will be her 3rd injection, advised she will need to follow up with Ortho- she will look for an Ortho near her home town and keep us posted once she finds someone who is on her plan           Procedure Note  I  have explained the risks, benefits of joint injection.  The patient voices understanding and questions have been answered.  The patient agrees to proceed.  Time out completed and consent signed and reviewed.     After both verbal and written consent obtained, the patient was placed in the appropriate anatomic position. The area was marked, draped, and prepped to created a sterile environment. Using sterile technique, right shoulder was prepped and topical Gebauer's Ethyl Chloride was used as local anesthetic. 1 ml kenalog 40mg  and 2 ml Lidocaine 1% without Epi was placed in syringe. Using a 21 gauge needle,  the right posterior GH joint was injected, by sterile technique. The patient tolerated the procedure well. The patient was hemostatic post procedure. Skin care directions were given.  The procedure was well tolerated.  The patient was hemostatic post procedure. Enc patient to continue to rest the joint for a few more days before resuming regular activities. Ok to ice area for 20 minutes at time if needed.  It may be more painful for the first 1-2 days.  Watch for fever, or increased swelling or persistent pain in the joint. Call or return to clinic prn if such symptoms occur or there is failure to improve as anticipated. Ice to area prn for 72 hours.         No follow-ups on file. In addition to their scheduled follow up, the patient has also been instructed to follow up on as needed basis.      Total time spent with patient and documentation is 60 minutes. All questions were answered to patient's satisfaction and  patient verbalized understanding.

## 2024-02-07 ENCOUNTER — TELEPHONE (OUTPATIENT)
Dept: RHEUMATOLOGY | Facility: CLINIC | Age: 54
End: 2024-02-07

## 2024-02-07 ENCOUNTER — OFFICE VISIT (OUTPATIENT)
Dept: RHEUMATOLOGY | Facility: CLINIC | Age: 54
End: 2024-02-07
Payer: MEDICAID

## 2024-02-07 ENCOUNTER — TELEPHONE (OUTPATIENT)
Dept: RHEUMATOLOGY | Facility: CLINIC | Age: 54
End: 2024-02-07
Payer: MEDICAID

## 2024-02-07 ENCOUNTER — HOSPITAL ENCOUNTER (OUTPATIENT)
Dept: RADIOLOGY | Facility: HOSPITAL | Age: 54
Discharge: HOME | End: 2024-02-07
Attending: NURSE PRACTITIONER
Payer: MEDICAID

## 2024-02-07 VITALS
BODY MASS INDEX: 35.79 KG/M2 | WEIGHT: 202 LBS | RESPIRATION RATE: 20 BRPM | TEMPERATURE: 98 F | HEIGHT: 63 IN | DIASTOLIC BLOOD PRESSURE: 86 MMHG | HEART RATE: 84 BPM | SYSTOLIC BLOOD PRESSURE: 129 MMHG | OXYGEN SATURATION: 92 %

## 2024-02-07 DIAGNOSIS — M15.9 PRIMARY OSTEOARTHRITIS INVOLVING MULTIPLE JOINTS: ICD-10-CM

## 2024-02-07 DIAGNOSIS — E11.9 TYPE 2 DIABETES MELLITUS WITHOUT COMPLICATION, WITH LONG-TERM CURRENT USE OF INSULIN: ICD-10-CM

## 2024-02-07 DIAGNOSIS — M25.511 CHRONIC RIGHT SHOULDER PAIN: ICD-10-CM

## 2024-02-07 DIAGNOSIS — L40.50 PSORIATIC ARTHRITIS: Primary | ICD-10-CM

## 2024-02-07 DIAGNOSIS — Z79.899 HIGH RISK MEDICATION USE: ICD-10-CM

## 2024-02-07 DIAGNOSIS — G89.29 CHRONIC RIGHT SHOULDER PAIN: ICD-10-CM

## 2024-02-07 DIAGNOSIS — M65.331 TRIGGER MIDDLE FINGER OF RIGHT HAND: ICD-10-CM

## 2024-02-07 DIAGNOSIS — I10 PRIMARY HYPERTENSION: ICD-10-CM

## 2024-02-07 DIAGNOSIS — Z79.4 TYPE 2 DIABETES MELLITUS WITHOUT COMPLICATION, WITH LONG-TERM CURRENT USE OF INSULIN: ICD-10-CM

## 2024-02-07 DIAGNOSIS — L40.50 PSORIATIC ARTHRITIS: ICD-10-CM

## 2024-02-07 DIAGNOSIS — M79.7 FIBROMYALGIA: ICD-10-CM

## 2024-02-07 DIAGNOSIS — E66.9 OBESITY, UNSPECIFIED CLASSIFICATION, UNSPECIFIED OBESITY TYPE, UNSPECIFIED WHETHER SERIOUS COMORBIDITY PRESENT: ICD-10-CM

## 2024-02-07 DIAGNOSIS — L40.9 PSORIASIS: ICD-10-CM

## 2024-02-07 DIAGNOSIS — G47.33 OSA (OBSTRUCTIVE SLEEP APNEA): ICD-10-CM

## 2024-02-07 PROCEDURE — 1159F MED LIST DOCD IN RCRD: CPT | Mod: CPTII,,, | Performed by: NURSE PRACTITIONER

## 2024-02-07 PROCEDURE — 1160F RVW MEDS BY RX/DR IN RCRD: CPT | Mod: CPTII,,, | Performed by: NURSE PRACTITIONER

## 2024-02-07 PROCEDURE — 3074F SYST BP LT 130 MM HG: CPT | Mod: CPTII,,, | Performed by: NURSE PRACTITIONER

## 2024-02-07 PROCEDURE — 20610 DRAIN/INJ JOINT/BURSA W/O US: CPT | Mod: S$PBB,RT,, | Performed by: NURSE PRACTITIONER

## 2024-02-07 PROCEDURE — 3008F BODY MASS INDEX DOCD: CPT | Mod: CPTII,,, | Performed by: NURSE PRACTITIONER

## 2024-02-07 PROCEDURE — 3079F DIAST BP 80-89 MM HG: CPT | Mod: CPTII,,, | Performed by: NURSE PRACTITIONER

## 2024-02-07 PROCEDURE — 73030 X-RAY EXAM OF SHOULDER: CPT | Mod: TC,RT

## 2024-02-07 PROCEDURE — 99214 OFFICE O/P EST MOD 30 MIN: CPT | Mod: PBBFAC,25 | Performed by: NURSE PRACTITIONER

## 2024-02-07 PROCEDURE — 99215 OFFICE O/P EST HI 40 MIN: CPT | Mod: S$PBB,25,, | Performed by: NURSE PRACTITIONER

## 2024-02-07 PROCEDURE — 4010F ACE/ARB THERAPY RXD/TAKEN: CPT | Mod: CPTII,,, | Performed by: NURSE PRACTITIONER

## 2024-02-07 PROCEDURE — 20610 DRAIN/INJ JOINT/BURSA W/O US: CPT | Mod: PBBFAC,RT | Performed by: NURSE PRACTITIONER

## 2024-02-07 RX ORDER — IXEKIZUMAB 80 MG/ML
80 INJECTION, SOLUTION SUBCUTANEOUS
Qty: 3 EACH | Refills: 1 | Status: SHIPPED | OUTPATIENT
Start: 2024-02-07 | End: 2024-02-28 | Stop reason: SDUPTHER

## 2024-02-07 RX ORDER — TRIAMCINOLONE ACETONIDE 40 MG/ML
40 INJECTION, SUSPENSION INTRA-ARTICULAR; INTRAMUSCULAR
Status: COMPLETED | OUTPATIENT
Start: 2024-02-07 | End: 2024-02-07

## 2024-02-07 RX ORDER — FOLIC ACID 1 MG/1
1000 TABLET ORAL DAILY
Qty: 90 TABLET | Refills: 3 | Status: SHIPPED | OUTPATIENT
Start: 2024-02-07 | End: 2024-06-06

## 2024-02-07 RX ORDER — LIDOCAINE HYDROCHLORIDE 10 MG/ML
5 INJECTION INFILTRATION; PERINEURAL
Status: COMPLETED | OUTPATIENT
Start: 2024-02-07 | End: 2024-02-07

## 2024-02-07 RX ADMIN — TRIAMCINOLONE ACETONIDE 40 MG: 40 INJECTION, SUSPENSION INTRA-ARTICULAR; INTRAMUSCULAR at 11:02

## 2024-02-07 RX ADMIN — LIDOCAINE HYDROCHLORIDE 5 ML: 10 INJECTION INFILTRATION; PERINEURAL at 11:02

## 2024-02-07 NOTE — TELEPHONE ENCOUNTER
Received Otrexup 20mg/0.4ml PA request through cover my meds key: [id number]. Called patient to ask when she would be getting labs done and she said she had them done in November 2023 and will do more on 3/4/24 with PCP. Attached OV and labs from Nov and did not send to plan yet.

## 2024-02-08 ENCOUNTER — TELEPHONE (OUTPATIENT)
Dept: RHEUMATOLOGY | Facility: CLINIC | Age: 54
End: 2024-02-08
Payer: MEDICAID

## 2024-02-08 NOTE — TELEPHONE ENCOUNTER
Otrexup PA denied -Not medically necessary. Then called [facility] [phone] and spoke with Hunter and was able to get approval until 2/7/25 with reference # [id number]. Called patient to notify PA approval. Then faxed PA approval to pharmacy. Scanned in chart with fax confirmation.

## 2024-02-08 NOTE — TELEPHONE ENCOUNTER
SPOKE TO PT INFORM HER OF MESSAGE PT VERBALIZED UNDERSTANDING          ----- Message from BRUNO Adams sent at 2/7/2024  3:41 PM CST -----  Please advise the patient the following shoulder Xray results: degenerative changes noted with calcification at tendon insertion (calcific tendonitis) that is contributing to her pain- please have her keep us posted in regards to Ortho near her so we can pursue referral for evaluation

## 2024-02-08 NOTE — TELEPHONE ENCOUNTER
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

## 2024-02-14 ENCOUNTER — TELEPHONE (OUTPATIENT)
Dept: RHEUMATOLOGY | Facility: CLINIC | Age: 54
End: 2024-02-14
Payer: MEDICAID

## 2024-02-14 NOTE — TELEPHONE ENCOUNTER
Received PA fax from [facility] re: [facility] is unable to fill taltz as according to patient plan. [facility] provider line [phone] and call is busy. [phone] spoke with Olesya and taltz is approved with ID number [id number] from 9/11/2023 to 9/11/2024. Per Olesya local  does not have to be a specialty pharmacy.     Called patient and she stated that she gets her taltz from [facility]. She reports received medication last month.   Called [facility] and was told that she received taltz on 1/25/24 and she is not due until ending of next week

## 2024-02-23 ENCOUNTER — TELEPHONE (OUTPATIENT)
Dept: RHEUMATOLOGY | Facility: CLINIC | Age: 54
End: 2024-02-23
Payer: MEDICAID

## 2024-02-27 ENCOUNTER — TELEPHONE (OUTPATIENT)
Dept: RHEUMATOLOGY | Facility: CLINIC | Age: 54
End: 2024-02-27
Payer: MEDICAID

## 2024-02-28 DIAGNOSIS — Z79.899 HIGH RISK MEDICATION USE: ICD-10-CM

## 2024-02-28 DIAGNOSIS — L40.9 PSORIASIS: ICD-10-CM

## 2024-02-28 DIAGNOSIS — L40.50 PSORIATIC ARTHRITIS: ICD-10-CM

## 2024-02-28 RX ORDER — IXEKIZUMAB 80 MG/ML
80 INJECTION, SOLUTION SUBCUTANEOUS
Qty: 3 EACH | Refills: 1 | Status: SHIPPED | OUTPATIENT
Start: 2024-02-28 | End: 2024-08-14

## 2024-02-28 NOTE — TELEPHONE ENCOUNTER
----- Message from BRUNO Adams sent at 2/27/2024  3:24 PM CST -----  Please reach out to the patient, labs from her PCP is noted from November 2023 and only a CMP, we need both a CMP and CBC- and is actually time for repeat, lab needs to be completed q3 months while taking Orextup- please find out if she has had more recent lab, if she has not, she will need a CBC and CMP completed this month- we can fax over to have completed near home but lab needs to be completed in order to continue med refills, thank her for her understanding.   ----- Message -----  From: Marilyn Kumar LPN  Sent: 2/27/2024   3:12 PM CST  To: BRUNO Adams      -----  Called patient and she was notified of lab results. She will be going to PCP tomorrow for labs. She said [facility] called her yesterday and she said that this clinic cancelled Taltz. Due next Tuesday 3/5. She would like to continue getting taltz from [facility].    Called [facility] spoke with Saeed and asked about taltz. He said that this clinic cancelled taltz cancelled on 2/7.     Pended taltz medication to [facility].

## 2024-02-28 NOTE — TELEPHONE ENCOUNTER
Checked cover my meds and TALTZ AUTOINJECTOR 80 MG/ML AUTO INJCT is covered for this Beneficiary without Prior Authorization.

## 2024-03-01 ENCOUNTER — TELEPHONE (OUTPATIENT)
Dept: RHEUMATOLOGY | Facility: CLINIC | Age: 54
End: 2024-03-01
Payer: MEDICAID

## 2024-03-01 NOTE — TELEPHONE ENCOUNTER
----- Message from BRUNO Adams sent at 2/27/2024  3:24 PM CST -----  Please reach out to the patient, labs from her PCP is noted from November 2023 and only a CMP, we need both a CMP and CBC- and is actually time for repeat, lab needs to be completed q3 months while taking Orextup- please find out if she has had more recent lab, if she has not, she will need a CBC and CMP completed this month- we can fax over to have completed near home but lab needs to be completed in order to continue med refills, thank her for her understanding.   ----- Message -----  From: Marilyn Kumar LPN  Sent: 2/27/2024   3:12 PM CST  To: BRUNO Adams

## 2024-03-11 ENCOUNTER — TELEPHONE (OUTPATIENT)
Dept: RHEUMATOLOGY | Facility: CLINIC | Age: 54
End: 2024-03-11
Payer: MEDICAID

## 2024-03-11 NOTE — TELEPHONE ENCOUNTER
----- Message from BRUNO Adams sent at 3/11/2024 12:14 PM CDT -----  Labs from PCP reviewed, thanks for having faxed over- clinically acceptable at this time  ----- Message -----  From: Marilyn Kumar LPN  Sent: 3/11/2024  10:37 AM CDT  To: BRUNO Adams    2/29/24 Lab results

## 2024-03-11 NOTE — TELEPHONE ENCOUNTER
Called and left voicemail message for patient to call clinic back. Attempting to notify of provider message.

## 2024-03-11 NOTE — TELEPHONE ENCOUNTER
Mi Hector [facility] Rheumatology Clinical Support Staff  Caller: Unspecified (Today,  3:22 PM)  Patient is returning a call from Marilyn Kumar please call her back, phone number verified.    ---------  Called patient back and she was notified of provided message. Verbalized understanding....ortega/lpn

## 2024-05-23 ENCOUNTER — TELEPHONE (OUTPATIENT)
Dept: RHEUMATOLOGY | Facility: CLINIC | Age: 54
End: 2024-05-23
Payer: MEDICAID

## 2024-05-23 NOTE — TELEPHONE ENCOUNTER
Please reach out to Mrs Cornell to see if she has had any lab work completed with her PCP this past month as she is due for lab monitoring on Otrexup this month, please keep me posted, if she has not, we can fax over orders for CBC/CMP to be completed, thanks

## 2024-07-03 ENCOUNTER — TELEPHONE (OUTPATIENT)
Dept: RHEUMATOLOGY | Facility: CLINIC | Age: 54
End: 2024-07-03
Payer: MEDICAID

## 2024-07-03 NOTE — TELEPHONE ENCOUNTER
Spoke with asked pt if she did her lab work. Pt stated she did at her PCP. I requested her lab results from Dr Donny Recio.

## 2024-07-03 NOTE — TELEPHONE ENCOUNTER
----- Message from BRUNO Adams sent at 7/2/2024  4:57 PM CDT -----  Please reach out to patient, she was supposed to have repeat lab with her PCP back in May, we called to have these labs fax over for review she is on methotrexate, please call patient to see if she has had lab completed and request PCP for review.

## 2024-07-29 ENCOUNTER — TELEPHONE (OUTPATIENT)
Dept: RHEUMATOLOGY | Facility: CLINIC | Age: 54
End: 2024-07-29
Payer: MEDICAID

## 2024-07-29 NOTE — TELEPHONE ENCOUNTER
Called pt's PCP office Dr Recio spoke with staff Mercedes  stated she will requested k\lab results to rheumatology today

## 2024-07-30 DIAGNOSIS — Z79.899 HIGH RISK MEDICATION USE: ICD-10-CM

## 2024-07-30 DIAGNOSIS — L40.50 PSORIATIC ARTHRITIS: ICD-10-CM

## 2024-07-30 DIAGNOSIS — L40.9 PSORIASIS: ICD-10-CM

## 2024-07-30 NOTE — TELEPHONE ENCOUNTER
Pt notified and verbalized understanding of results.   ----- Message from BRUNO Adams sent at 7/30/2024  2:45 PM CDT -----  Labs reviewed, please send refill request if needed- noted to be clinically acceptable at this time  ----- Message -----  From: Sherie Fulton LPN  Sent: 7/30/2024   2:39 PM CDT  To: BRUNO Adams

## 2024-09-18 NOTE — PROGRESS NOTES
"  Patient ID: [id number]     Chief Complaint: Psoriatic Arthritis (Established patient here for 6 month follow up/Pain: Shoulders back and Back/Medications: Methotrexate and Taltz Fully compliant with medication instructions/)      HPI:     Aneta Tyson is a 54 y.o. female here today for a follow up for Psoriatic Arthritis.      Presents for follow up of Psoriatic Arthritis and Fibromylagia.  Diagnosed with Psoriasis around 1997, diagnosed with Psoriatic Arthritis around 2015. She started Methotrexate in the past, stopped due to elevated liver enzymes. Otezla was started and significant GI  issues. In late October 2017,  changed to Stelara. At f/u she  found improvement with Stelara in her skin, but not in her joints and lost efficacy.  Has failed Enbrel in the past. Started Taltz in April 2019 w/good response w/skin and joints. Injection site reaction reported lasting 3-4 days. She is a former ballet dancer, used to wear toe shoes. Pain is with flexion of the great toes. Xrays show loss of joint space.  She continues to have right shoulder pain- has been injected here in clinic (12/1/2022, 8/7/2023). States bilateral feet "burn" at night time- most likely neuropathy from DM.  Does have right hand trigger finger to middle finger- has had a injection in the past with no relief- does need surgery, saw Ortho here but has deferred surgery. 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 She does have a nodule to her right inner elbow and right wrist, denies pain to these areas. Diagnosed with pancreatitis in December of 2022 at [facility] in [place], she denies any ETOH use and states she "ate a lot of bread pudding" and ended up in the ER and was kept for a few days.     February 2024: Here today for follow up. She has continued Otrexup 20mg qweek and Talz 80 mg qmonth as directed and doing well, no rashes with current meds. She denies any " red/warm/swollen joints, morning stiffness lasting roughly 15 minutes. She continues to have shoulder pain that comes and goes, Xrays were ordered at her last visit as she states her pain resolved so decided not complete. She had an injection at her last apt and does feel that it helped for roughly 4-5 months, pain had improved. Since it has returned and would like a repeat injection today if possible. Has not completed any PT as she has deferred. She has continued to take [place] from PCP along with heat and this does help as well.     September 2024: Here today for follow-up visit. She has continued Otrexup 20mg qweek and Talz 80 mg qmonth as directed and doing well, no rashes with current meds. She denies any red/warm/swollen joints, morning stiffness lasting roughly 15 minutes. She denies any issues with rashes, have been stable. She reports her right shoulder has improved some but is having pain to her left shoulder that comes and goes, has bothered in the past but right had been more painful, no recent injury, tolerable at this time.   Suspected Covid at the end of 8/20024 and recovered well with no lingering issues.     Denies any recent hospitalizations  since last visit.     Dr. Moraes- Opthalmology in [place]- had visit 9/19/24- new glasses    PMH: COPD, hernia, GERD, hypothyroidism, hypertension, and hyperlipidemia.    Denies history of fevers, rashes, photosensitivity, oral or nasal ulcers, h/o MI, stroke, seizures, h/o PE or DVT, Raynaud's phenomenon, uveitis, malignancies.   Family history of autoimmune disease: none.  Pregnancies: 1 (twins)  Miscarriages: none.   Smoking: occ smoking/vaps a few times a months- enc to quit- and has decreased use, has not vaped for the past several weeks, congratulated her on this!    Social History     Tobacco Use   Smoking Status Some Days    Types: Vaping with nicotine   Smokeless Tobacco Not on file   Tobacco Comments    Smokes and vapes occasionally. One pack  "lasts about a month.      -------------------------------------    COPD (chronic obstructive pulmonary disease)    Diabetes mellitus    GERD (gastroesophageal reflux disease)    Hypertension    Psoriatic arthritis    Thyroid disease        Past Surgical History:   Procedure Laterality Date    ADENOIDECTOMY      APPENDECTOMY      CARPAL TUNNEL RELEASE       SECTION      CHOLECYSTECTOMY      COLONOSCOPY      HYSTERECTOMY      NISSEN FUNDOPLICATION      TONSILLECTOMY      UVULECTOMY         Review of patient's allergies indicates:   Allergen Reactions    Doxycycline Diarrhea     severe    Promethazine Nausea And Vomiting       Outpatient Medications Marked as Taking for the 24 encounter (Office Visit) with Zaida Hassan FNP   Medication Sig Dispense Refill    atorvastatin (LIPITOR) 10 MG tablet Take 10 mg by mouth once daily.      BD ULTRA-FINE MINI PEN NEEDLE 31 gauge x 3/16" Ndle Inject 1 each into the skin 3 (three) times daily.      cetirizine (ZYRTEC) 10 MG tablet Take 10 mg by mouth once daily.      duloxetine (CYMBALTA) 60 MG capsule Take 60 mg by mouth once daily.      glipiZIDE (GLUCOTROL) 10 MG TR24 Take 10 mg by mouth 2 (two) times daily.      hydrocodone-acetaminophen 5-325mg (NORCO) 5-325 mg per tablet Take 1 tablet by mouth 3 (three) times daily as needed for Pain.      levothyroxine (SYNTHROID) 50 MCG tablet Take 50 mcg by mouth once daily.      LINZESS 145 mcg Cap capsule Take 145 mcg by mouth.      lisinopril (PRINIVIL,ZESTRIL) 20 MG tablet Take 20 mg by mouth once daily.      methotrexate, PF, (OTREXUP) 20 mg/0.4 mL AtIn Inject 0.4 mLs (20 mg total) into the skin every 7 days. 4 each 2    montelukast (SINGULAIR) 10 mg tablet Take 10 mg by mouth every evening.      pantoprazole (PROTONIX) 40 MG tablet Take 40 mg by mouth.      potassium chloride (MICRO-K) 10 MEQ CpSR Take 10 mEq by mouth once daily.      TALTZ AUTOINJECTOR 80 mg/mL AtIn SMARTSI Pre-Filled Pen Syringe SUB-Q " Every 4 Weeks      tirzepatide 7.5 mg/0.5 mL PnIj Inject 7.5 mg into the skin every 7 days.      TRESIBA FLEXTOUCH U-100 100 unit/mL (3 mL) insulin pen Inject into the skin.      verapamil (CALAN-SR) 240 MG CR tablet Take 240 mg by mouth every evening.         Social History     Socioeconomic History    Marital status: Single   Tobacco Use    Smoking status: Some Days     Types: Vaping with nicotine    Tobacco comments:     Smokes and vapes occasionally. One pack lasts about a month.   Substance and Sexual Activity    Alcohol use: No    Drug use: Not Currently    Sexual activity: Not Currently        Family History   Problem Relation Name Age of Onset    Hypertension Mother      Atrial fibrillation Father      Heart disease Father      Diabetes Maternal Grandmother            There is no immunization history on file for this patient.    Patient Care Team:  Donny Recio MD as PCP - General (Family Medicine)     Subjective:         Constitutional:  Denies chills. Denies fever. Denies night sweats. Denies weight loss.   Ophthalmology: Denies blurred vision from DM retinopathy and cataracts- follows with Ophthalmology. Denies dry eyes. Denies eye pain. Denies Itching and redness.   ENT: Denies oral ulcers. Denies epistaxis. Denies dry mouth. Denies swollen glands.   Endocrine: Admits diabetes. Admits thyroid Problems- hypothyroidism on Synthroid-both followed by PCP.  Respiratory: Denies cough. Denies shortness of breath. Denies shortness of breath with exertion. Denies hemoptysis.   Cardiovascular: Denies chest pain at rest. Denies chest pain with exertion. Denies palpitations.    Gastrointestinal: Denies abdominal pain. Denies diarrhea and constipation, follows with GI- does have history of Diverticulosis, no IBD. Denies nausea. Denies vomiting. Denies hematemesis or hematochezia. Denies heartburn.  Genitourinary: Denies blood in urine.  Musculoskeletal: See HPI for details  Integumentary: Denies rash. Denies  "photosensitivity.   Peripheral Vascular: Denies Ulcers of hands and/or feet. Denies Cold extremities.   Neurologic: Denies dizziness. Denies headache.  Denies loss of strength. Admits burning to bilateral feet at night time- from neuropathy from DM.  Psychiatric: Denies depression and anxiety- currently controlled with meds- making adjustments as she could not tolerate Abilify due to tremor s/e. Denies suicidal/homicidal ideations.      Objective     /75 (BP Location: Left arm, Patient Position: Sitting, BP Method: Large (Automatic))   Pulse 74   Temp 98.6 °F (37 °C) (Oral)   Resp 20   Ht 5' 3" (1.6 m)   Wt 90.4 kg (199 lb 3.2 oz)   SpO2 99%   BMI 35.29 kg/m²     Physical Exam    General Appearance: alert, pleasant, in no acute distress.  Skin: Skin color, texture, turgor normal. No rashes. 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 3-4 cm annular lesions with irregular margins and slightly erythematous and raised, non tender, on bilateral shins, 3-4 in number- stable.   ENT: No oral or nasal ulcers.  Neck:  Neck supple. No adenopathy.   Lungs: CTA throughout without crackles, rhonchi, or wheezes.   Heart: RRR w/o murmurs.  No edema. 2+ DP pulse.  Neuro: Alert, oriented, CN II-XII GI, sensory and motor innervation intact.  Musculoskeletal: No active synovitis noted on exam, no pain to bilateral MCPs, synovial thickening is noted, no swelling or redness, + nodule noted to left wrist, non-tender. FROM noted to bilateral wrist with no pain, FROM note bilateral elbows and shoulders, + crepitus noted to bilateral knees with good ROM noted, no pain to bilateral MTPs, no dactylitis noted.    Psych: Alert, oriented, normal eye contact.     Labs:   2/8/2021: Infectious work up (-)      12/2/22- Ca+ slightly elevated (10.7) CBC ok, CRP/Sed normal    4/5/23: CBC and CMP (Ca+ now 9.4) ok with PCP ok.     9/19/2023 CBC ok. Sed Rate 5 (<15), CMP ok, AST 12L,  Hep B/C/HIV and " Quantiferon not detected, CRP 1 (<10).     11/21/2023 CMP glucose 252, otherwise ok. A1C 6.09, TSH ok.     2/29/2024 CBC hct 42H, otherwise ok. CMP ok. A1C 7.04, CRP 1 (<10).     07/24/2024 CBC platelets 114 okay, CMP glucose 166, otherwise okay, A1c 6.15    Lab Results   Component Value Date    WBC 7.9 12/01/2022    HGB 13.7 12/01/2022    HCT 40.4 12/01/2022     12/01/2022    ALT 25 12/01/2022    AST 14 12/01/2022    BUN 14.6 12/01/2022    CREATININE 0.90 12/01/2022     12/01/2022    K 4.3 12/01/2022     12/01/2022    CO2 25 12/01/2022    CRP 2.00 12/01/2022    SEDRATE 1 12/01/2022        Imaging:   3/26/2019 XR Hand 3 views of both hands: IMPRESSION: Left/Right: No evidence for inflammatory arthropathy or osteoarthritis. Radial angulation of the distal interphalangeal joints of the fifth fingers. Mild positive ulnar variance.    3/26/2019: Left foot 3 views: Tiny anterior calcaneal spur. IMPRESSION: No significant abnormality identified  Right foot 3 views: Minimal narrowing of the distal interphalangeal joints articular spaces are otherwise normal.    3/26/2019: XR Chest 2 Views: IMPRESSION: No acute Cardiopulmonary process identified.     2/8/2021- XR Lumbar spine 4 views or more: Slight levoscoliosis with maximum curvature at the level of L2/L3 vertebral bodies are of normal height, position and alignment with some degenerative changes of the posterior elements mostly at L5-S1. IMPRESSION: Minimal degenerative changes.    2/7/2024 Right Shoulder Xray: FINDINGS:There is some narrowing of the acromioclavicular joint with minimal degenerative changes of the glenohumeral joint articular spaces otherwise preserved with smooth articular surfaces. Calcification is seen at the insertion of the supraspinatus tendon indicating the presence of calcific tendinitis. Impression: Degenerative changes. Changes of calcific tendinitis.     Assessment:       ICD-10-CM ICD-9-CM   1. Psoriatic arthritis  L40.50  696.0   2. Psoriasis  L40.9 696.1   3. Fibromyalgia  M79.7 729.1   4. High risk medication use  Z79.899 V58.69   5. Drug-induced immunodeficiency  D84.821 279.3    Z79.899 E947.9   6. Primary osteoarthritis involving multiple joints  M15.9 715.98   7. Type 2 diabetes mellitus without complication, with long-term current use of insulin  E11.9 250.00    Z79.4 V58.67   8. Primary hypertension  I10 401.9   9. JENNIFER (obstructive sleep apnea)  G47.33 327.23   10. Obesity, unspecified classification, unspecified obesity type, unspecified whether serious comorbidity present  E66.9 278.00   11. Trigger middle finger of right hand  M65.331 727.03   12. Chronic right shoulder pain  M25.511 719.41    G89.29 338.29           Plan:     1. Psoriasic Arthritis and Psoriasis  Dx with Psoriasis 1997, dx with Psoriatic Arthritis 2015. Failed treatment with MTX (elevated liver enzymes). Otezla - significant GI  issues. Stelara- lost efficacy.  Has also failed Enbrel in the past. Has been doing well with both Taltz and Otrexup. Today on exam, no active synovitis noted.   - Continue Taltz 80mg every 4 weeks subcu.   - Continue Otrexup 20mg/subcu weekly with daily folic acid supplements.  - Lab today for continued monitoring, wishes to have orders printed out to have completed near home as she has lab q3 months with PCP  - Last Cxray in 2019, reports she had a Cxray at [facility]- we will request a copy for records- order also given to have completed if not able to obtain- printed today and given directly to her to have completed in her home town  - Infection w/u  negative 9/2023- repeat ordered today- labs printed- will have completed next month with PCP- last labs in July 2024 ok  - She follows up q6 months with lab every 3 months with PCP     3. Fibromyalgia   - Reinforced sleep hygiene, control of underlying anxiety and depression, and encouraged gentle exercise.  - Stable today      4. High risk medication use /drug-induced  immunodeficiency  -Advised to stay up-to-date on age appropriate vaccinations and malignancy screening with PCP.   - Mammogram UTD per report, Colonoscopy is due plans to follow up with PCP to schedule- strongly enc to do so, PAPs complete due to hysterectomy   -Persons with rheumatoid arthritis, lupus, psoriatic arthritis and other autoimmune diseases are at increased risk of cardiovascular disease including heart attack and stroke. We recommend that all patients with these conditions have annual health maintenance exams including lipid measurements, blood pressure measurements, and smoking cessation counseling when applicable at their primary care provider's office.  -Continued lab monitoring  -Flu vaccine UTD with PCP     5. Osteoarthritis  -Knees and b/l 1st MTP joint. +crepitus with knees.   -OTC topicals ok to continue to use, warm compresses and weight loss- just started Mounjaro to help assist with weight loss as well as DM     6. Primary hypertension and T2DM   - Followed by PCP, currently stable and at goal today, , encourage low-sodium diet  -  July 2024 A1C 6 with Mounjaro encouraged diet modifications of sugar/carb/starch intake     8. JENNIFER (obstructive sleep apnea)   - Enc use of CPAP/followed by PCP, currently denies use- reports needs new supplies, enc to follow up and reach out to her PCP for assistance      9. Obesity    - Enc diet modifications and exercise as tolerated (TLC)      10.   Trigger finger of middle right hand  - Referred to Ortho- was seen March 2023 and referral to surgeon was made however she has decided to defer surgery at this time and has been tolerable.     11. Right Shoulder Pain  - 2/7/2024 Right Shoulder Xray: FINDINGS: There is some narrowing of the acromioclavicular joint with minimal degenerative changes of the glenohumeral joint articular spaces otherwise preserved with smooth articular surfaces. Calcification is seen at the insertion of the supraspinatus tendon indicating  the presence of calcific tendinitis. Impression: Degenerative changes. Changes of calcific tendinitis.   - Deferred PT in the past  - Last IA injection completed 2/24 and doing ok  - Currently stable            No follow-ups on file. In addition to their scheduled follow up, the patient has also been instructed to follow up on as needed basis.      Total time spent with patient and documentation is 35 minutes. All questions were answered to patient's satisfaction and patient verbalized understanding. This includes face to face time and non-face to face time preparing to see the patient (eg, review of tests), obtaining and/or reviewing separately obtained history, documenting clinical information in the electronic or other health record, independently interpreting results and communicating results to the patient/family/caregiver, or care coordinator.

## 2024-09-19 ENCOUNTER — OFFICE VISIT (OUTPATIENT)
Dept: RHEUMATOLOGY | Facility: CLINIC | Age: 54
End: 2024-09-19
Payer: MEDICAID

## 2024-09-19 VITALS
DIASTOLIC BLOOD PRESSURE: 75 MMHG | OXYGEN SATURATION: 99 % | RESPIRATION RATE: 20 BRPM | HEIGHT: 63 IN | SYSTOLIC BLOOD PRESSURE: 108 MMHG | WEIGHT: 199.19 LBS | HEART RATE: 74 BPM | TEMPERATURE: 99 F | BODY MASS INDEX: 35.29 KG/M2

## 2024-09-19 DIAGNOSIS — Z79.899 DRUG-INDUCED IMMUNODEFICIENCY: ICD-10-CM

## 2024-09-19 DIAGNOSIS — L40.50 PSORIATIC ARTHRITIS: Primary | ICD-10-CM

## 2024-09-19 DIAGNOSIS — M15.9 PRIMARY OSTEOARTHRITIS INVOLVING MULTIPLE JOINTS: ICD-10-CM

## 2024-09-19 DIAGNOSIS — I10 PRIMARY HYPERTENSION: ICD-10-CM

## 2024-09-19 DIAGNOSIS — Z79.4 TYPE 2 DIABETES MELLITUS WITHOUT COMPLICATION, WITH LONG-TERM CURRENT USE OF INSULIN: ICD-10-CM

## 2024-09-19 DIAGNOSIS — G47.33 OSA (OBSTRUCTIVE SLEEP APNEA): ICD-10-CM

## 2024-09-19 DIAGNOSIS — E66.09 CLASS 2 OBESITY DUE TO EXCESS CALORIES WITHOUT SERIOUS COMORBIDITY WITH BODY MASS INDEX (BMI) OF 35.0 TO 35.9 IN ADULT: ICD-10-CM

## 2024-09-19 DIAGNOSIS — E11.9 TYPE 2 DIABETES MELLITUS WITHOUT COMPLICATION, WITH LONG-TERM CURRENT USE OF INSULIN: ICD-10-CM

## 2024-09-19 DIAGNOSIS — G89.29 CHRONIC RIGHT SHOULDER PAIN: ICD-10-CM

## 2024-09-19 DIAGNOSIS — L40.9 PSORIASIS: ICD-10-CM

## 2024-09-19 DIAGNOSIS — M65.331 TRIGGER MIDDLE FINGER OF RIGHT HAND: ICD-10-CM

## 2024-09-19 DIAGNOSIS — M79.7 FIBROMYALGIA: ICD-10-CM

## 2024-09-19 DIAGNOSIS — Z79.899 HIGH RISK MEDICATION USE: ICD-10-CM

## 2024-09-19 DIAGNOSIS — M25.511 CHRONIC RIGHT SHOULDER PAIN: ICD-10-CM

## 2024-09-19 DIAGNOSIS — D84.821 DRUG-INDUCED IMMUNODEFICIENCY: ICD-10-CM

## 2024-09-19 PROCEDURE — 3074F SYST BP LT 130 MM HG: CPT | Mod: CPTII,,, | Performed by: NURSE PRACTITIONER

## 2024-09-19 PROCEDURE — 4010F ACE/ARB THERAPY RXD/TAKEN: CPT | Mod: CPTII,,, | Performed by: NURSE PRACTITIONER

## 2024-09-19 PROCEDURE — 3078F DIAST BP <80 MM HG: CPT | Mod: CPTII,,, | Performed by: NURSE PRACTITIONER

## 2024-09-19 PROCEDURE — 3008F BODY MASS INDEX DOCD: CPT | Mod: CPTII,,, | Performed by: NURSE PRACTITIONER

## 2024-09-19 PROCEDURE — 1160F RVW MEDS BY RX/DR IN RCRD: CPT | Mod: CPTII,,, | Performed by: NURSE PRACTITIONER

## 2024-09-19 PROCEDURE — 99215 OFFICE O/P EST HI 40 MIN: CPT | Mod: PBBFAC | Performed by: NURSE PRACTITIONER

## 2024-09-19 PROCEDURE — 1159F MED LIST DOCD IN RCRD: CPT | Mod: CPTII,,, | Performed by: NURSE PRACTITIONER

## 2024-09-19 PROCEDURE — 99214 OFFICE O/P EST MOD 30 MIN: CPT | Mod: S$PBB,,, | Performed by: NURSE PRACTITIONER

## 2024-09-19 RX ORDER — IXEKIZUMAB 80 MG/ML
INJECTION, SOLUTION SUBCUTANEOUS
Qty: 1 EACH | Refills: 5 | Status: SHIPPED | OUTPATIENT
Start: 2024-09-19

## 2024-09-19 RX ORDER — LINACLOTIDE 145 UG/1
145 CAPSULE, GELATIN COATED ORAL
COMMUNITY
Start: 2024-07-16

## 2024-09-19 RX ORDER — PANTOPRAZOLE SODIUM 40 MG/1
40 TABLET, DELAYED RELEASE ORAL
COMMUNITY
Start: 2024-06-17

## 2024-09-19 RX ORDER — IXEKIZUMAB 80 MG/ML
INJECTION, SOLUTION SUBCUTANEOUS
COMMUNITY
Start: 2024-08-19 | End: 2024-09-19 | Stop reason: SDUPTHER

## 2024-10-21 ENCOUNTER — TELEPHONE (OUTPATIENT)
Dept: RHEUMATOLOGY | Facility: CLINIC | Age: 54
End: 2024-10-21
Payer: MEDICAID

## 2024-10-21 NOTE — TELEPHONE ENCOUNTER
Friendly reminder to patient to communicate she has labs that needs to be completed. Patient has print out of labs and reports she will completed at [facility] and have them faxed to our facility. Orders have been dc'd from computer so that we don't continue to call patient.

## 2024-10-31 ENCOUNTER — TELEPHONE (OUTPATIENT)
Dept: RHEUMATOLOGY | Facility: CLINIC | Age: 54
End: 2024-10-31
Payer: MEDICAID

## 2024-11-19 ENCOUNTER — TELEPHONE (OUTPATIENT)
Dept: RHEUMATOLOGY | Facility: CLINIC | Age: 54
End: 2024-11-19
Payer: MEDICAID

## 2024-11-19 NOTE — TELEPHONE ENCOUNTER
----- Message from BRUNO Adams sent at 11/18/2024  3:56 PM CST -----  Remainder of labs reviewed, all lab clinically acceptable at this time, Infection w/u negative- repeat lab at f/u visit  ----- Message -----  From: Draren Umaña LPN  Sent: 11/18/2024   3:34 PM CST  To: BRUNO Adams    I will follow up. I have requested the specific labs from [facility] and will also call patient  ----- Message -----  From: Zaida Hassan FNP  Sent: 11/18/2024  12:08 PM CST  To: Darren Umaña LPN    Lab reviewed, however this was not everything ordered- she is missing several labs- including CBC/CMP/infection w/u and CRP/ESR- were these completed  ----- Message -----  From: Darren Umaña LPN  Sent: 11/18/2024  11:20 AM CST  To: BRUNO Adams

## 2025-02-19 DIAGNOSIS — Z79.899 DRUG-INDUCED IMMUNODEFICIENCY: ICD-10-CM

## 2025-02-19 DIAGNOSIS — L40.9 PSORIASIS: ICD-10-CM

## 2025-02-19 DIAGNOSIS — Z79.899 HIGH RISK MEDICATION USE: ICD-10-CM

## 2025-02-19 DIAGNOSIS — D84.821 DRUG-INDUCED IMMUNODEFICIENCY: ICD-10-CM

## 2025-02-19 DIAGNOSIS — L40.50 PSORIATIC ARTHRITIS: ICD-10-CM

## 2025-02-19 RX ORDER — IXEKIZUMAB 80 MG/ML
INJECTION, SOLUTION SUBCUTANEOUS
Qty: 1 EACH | Refills: 2 | Status: SHIPPED | OUTPATIENT
Start: 2025-02-19

## 2025-03-18 NOTE — PROGRESS NOTES
"  Patient ID: [id number]     Chief Complaint: Follow-up and Psoriatic Arthritis (No changes since last encounter/Went to [facility] patient blacked out and fell in shower 02.03.25 Rt shoulder started "popping" again2.03.25/Both shoulder hurt and rt started popping again Reports in last 3-4 months she is weaker//)      HPI:     Aneta Tyson is a 54 y.o. female here today for a follow up for Psoriatic Arthritis.      Presents for follow up of Psoriatic Arthritis and Fibromylagia.  Diagnosed with Psoriasis around 1997, diagnosed with Psoriatic Arthritis around 2015. She started Methotrexate in the past, stopped due to elevated liver enzymes. Otezla was started and significant GI  issues. In late October 2017,  changed to Stelara. At f/u she  found improvement with Stelara in her skin, but not in her joints and lost efficacy.  Has failed Enbrel in the past. Started Taltz in April 2019 w/good response w/skin and joints. Injection site reaction reported lasting 3-4 days. She is a former ballet dancer, used to wear toe shoes. Pain is with flexion of the great toes. Xrays show loss of joint space.  She continues to have right shoulder pain- has been injected here in clinic (12/1/2022, 8/7/2023). States bilateral feet "burn" at night time- most likely neuropathy from DM.  Does have right hand trigger finger to middle finger- has had a injection in the past with no relief- does need surgery, saw Ortho here but has deferred surgery. 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 She does have a nodule to her right inner elbow and right wrist, denies pain to these areas. Diagnosed with pancreatitis in December of 2022 at [facility] in [place], she denies any ETOH use and states she "ate a lot of bread pudding" and ended up in the ER and was kept for a few days.     February 2024: Here today for follow up. She has continued Otrexup 20mg qweek " and Talz 80 mg qmonth as directed and doing well, no rashes with current meds. She denies any red/warm/swollen joints, morning stiffness lasting roughly 15 minutes. She continues to have shoulder pain that comes and goes, Xrays were ordered at her last visit as she states her pain resolved so decided not complete. She had an injection at her last apt and does feel that it helped for roughly 4-5 months, pain had improved. Since it has returned and would like a repeat injection today if possible. Has not completed any PT as she has deferred. She has continued to take [place] from PCP along with heat and this does help as well.     September 2024: Here today for follow-up visit. She has continued Otrexup 20mg qweek and Talz 80 mg qmonth as directed and doing well, no rashes with current meds. She denies any red/warm/swollen joints, morning stiffness lasting roughly 15 minutes. She denies any issues with rashes, have been stable. She reports her right shoulder has improved some but is having pain to her left shoulder that comes and goes, has bothered in the past but right had been more painful, no recent injury, tolerable at this time.   Suspected Covid at the end of 8/20024 and recovered well with no lingering issues.     March 2025: Here today for follow-up visit. She has continued Otrexup 20mg qweek and Talz 80 mg qmonth as directed and doing well, no rashes with current meds. She denies any red/warm/swollen joints, morning stiffness lasting roughly 15-20 minutes. She denies any issues with rashes, have been stable. She did have the flu in January 2025 and recovered well- did hold meds until symptoms resolved. She did have a fall last month while taking a shower- states got dizzy and blacked out, did go into the ER and had Xrays of face, no fractures- she states EKG was completed ok- also dx with Orthostatic Hypotension, she was also dx with Vertigo in the past. Also had CT of head and also reports was ok- has    "followed up with PCP x 2 and has f/u next week as well. She has noticed she has been having some "popping" in her left shoulder- does not occur often. She was referred to Ortho in the past for trigger finger and shoulders, was seen x 1 and wishes to follow up closer to home if needed.     Denies any recent illness or hospitalizations  since last visit.     Dr. Harley- Opthalmology in [place]- had visit 24- new glasses    PMH: COPD, hernia, GERD, hypothyroidism, hypertension, and hyperlipidemia.    Denies history of fevers, rashes, photosensitivity, oral or nasal ulcers, h/o MI, stroke, seizures, h/o PE or DVT, Raynaud's phenomenon, uveitis, malignancies.   Family history of autoimmune disease: none.  Pregnancies: 1 (twins)  Miscarriages: none.   Smoking: occ smoking/vaps a few times a months- enc to quit- and has decreased use, has continued to vape but is trying to quit     Social History     Tobacco Use   Smoking Status Some Days    Types: Vaping with nicotine   Smokeless Tobacco Not on file   Tobacco Comments    Smokes and vapes occasionally. One pack lasts about a month.      -------------------------------------    COPD (chronic obstructive pulmonary disease)    Diabetes mellitus    GERD (gastroesophageal reflux disease)    Hypertension    Psoriatic arthritis    Thyroid disease        Past Surgical History:   Procedure Laterality Date    ADENOIDECTOMY      APPENDECTOMY      CARPAL TUNNEL RELEASE       SECTION      CHOLECYSTECTOMY      COLONOSCOPY      HYSTERECTOMY      NISSEN FUNDOPLICATION      TONSILLECTOMY      UVULECTOMY         Review of patient's allergies indicates:   Allergen Reactions    Doxycycline Diarrhea     severe    Promethazine Nausea And Vomiting       Outpatient Medications Marked as Taking for the 3/19/25 encounter (Office Visit) with Zaida Hassan FNP   Medication Sig Dispense Refill    atorvastatin (LIPITOR) 10 MG tablet Take 10 mg by mouth once daily.      BD " "ULTRA-FINE MINI PEN NEEDLE 31 gauge x 3/16" Ndle Inject 1 each into the skin 3 (three) times daily.      cetirizine (ZYRTEC) 10 MG tablet Take 10 mg by mouth once daily.      duloxetine (CYMBALTA) 60 MG capsule Take 60 mg by mouth once daily.      FREESTYLE HARMEET 3 SENSOR Brooke       HUMALOG KWIKPEN INSULIN 100 unit/mL pen Inject into the skin.      levothyroxine (SYNTHROID) 50 MCG tablet Take 50 mcg by mouth once daily.      LINZESS 145 mcg Cap capsule Take 145 mcg by mouth.      lisinopril (PRINIVIL,ZESTRIL) 20 MG tablet Take 20 mg by mouth once daily.      montelukast (SINGULAIR) 10 mg tablet Take 10 mg by mouth every evening.      MOUNJARO 10 mg/0.5 mL PnIj       pantoprazole (PROTONIX) 40 MG tablet Take 40 mg by mouth.      potassium chloride (MICRO-K) 10 MEQ CpSR Take 10 mEq by mouth once daily.      TRESIBA FLEXTOUCH U-100 100 unit/mL (3 mL) insulin pen Inject into the skin.      verapamil (CALAN-SR) 240 MG CR tablet Take 240 mg by mouth every evening.      [DISCONTINUED] folic acid (FOLVITE) 1 MG tablet Take 1 tablet (1,000 mcg total) by mouth once daily. 90 tablet 3    [DISCONTINUED] methotrexate, PF, (OTREXUP) 20 mg/0.4 mL AtIn Inject 0.4 mLs (20 mg total) into the skin every 7 days. 4 each 2    [DISCONTINUED] TALTZ AUTOINJECTOR 80 mg/mL AtIn Inject 80 mg subq q 4 weeks. Hold for fever or infections. 1 each 2       Social History     Socioeconomic History    Marital status: Single   Tobacco Use    Smoking status: Some Days     Types: Vaping with nicotine    Tobacco comments:     Smokes and vapes occasionally. One pack lasts about a month.   Substance and Sexual Activity    Alcohol use: No    Drug use: Not Currently    Sexual activity: Not Currently        Family History   Problem Relation Name Age of Onset    Hypertension Mother      Atrial fibrillation Father      Heart disease Father      Diabetes Maternal Grandmother            There is no immunization history on file for this patient.    Patient Care " "Team:  Donny Recio MD as PCP - General (Family Medicine)     Subjective:         Constitutional:  Denies chills. Denies fever. Denies night sweats- occ. Denies weight loss.   Ophthalmology: Admits blurred vision from DM retinopathy and cataracts- follows with Ophthalmology- given new rx for glasses. Denies dry eyes. Denies eye pain. Denies Itching and redness.   ENT: Denies oral ulcers. Denies epistaxis. Denies dry mouth. Denies swollen glands.   Endocrine: Admits diabetes. Admits thyroid Problems- hypothyroidism on Synthroid-both followed by PCP.  Respiratory: Denies cough. Denies shortness of breath. Denies shortness of breath with exertion. Denies hemoptysis.   Cardiovascular: Denies chest pain at rest. Denies chest pain with exertion. Denies palpitations.    Gastrointestinal: Denies abdominal pain. Denies diarrhea and constipation, follows with GI- does have history of Diverticulosis, no IBD. Denies nausea. Denies vomiting. Denies hematemesis or hematochezia. Denies heartburn.  Genitourinary: Denies blood in urine.  Musculoskeletal: See HPI for details  Integumentary: Denies rash. Denies photosensitivity.   Peripheral Vascular: Denies Ulcers of hands and/or feet. Denies Cold extremities.   Neurologic: Denies dizziness. Denies headache.  Denies loss of strength. Admits burning to bilateral feet at night time- from neuropathy from DM- reports has been improved  Psychiatric: Denies depression and anxiety- currently controlled with meds- making adjustments as she could not tolerate Abilify due to tremor s/e. Denies suicidal/homicidal ideations.      Objective     /83 (BP Location: Left arm, Patient Position: Sitting)   Pulse 94   Temp 98.3 °F (36.8 °C) (Oral)   Resp 20   Ht 5' 3" (1.6 m)   Wt 88.9 kg (196 lb)   SpO2 97%   BMI 34.72 kg/m²     Physical Exam    General Appearance: alert, pleasant, in no acute distress.  Skin: Skin color, texture, turgor normal. No rashes. Diabetic necrobiosis " lipoidica lesions to right shin area, no active plaques noted. 3-4 cm annular lesions with irregular margins and slightly erythematous and raised, non tender, on bilateral shins, 3-4 in number- stable. Mild rash noted to bilateral flexors of elbows- very mild  ENT: No oral or nasal ulcers.  Neck:  Neck supple. No adenopathy.   Lungs: CTA throughout without crackles, rhonchi, or wheezes.   Heart: RRR w/o murmurs.  No edema. 2+ DP pulse.  Neuro: Alert, oriented, CN II-XII GI, sensory and motor innervation intact.  Musculoskeletal: No active synovitis noted on exam, no pain to bilateral MCPs/PIPs/DIPs, synovial thickening is noted, no swelling or redness, + nodule noted to left wrist, non-tender. FROM noted to bilateral wrist with no pain, FROM noted bilateral elbows and shoulders, + crepitus noted to bilateral knees with good ROM noted, no pain to bilateral MTPs, no dactylitis noted.    Psych: Alert, oriented, normal eye contact.     Labs:   2/8/2021: Infectious work up (-)      12/2/22- Ca+ slightly elevated (10.7) CBC ok, CRP/Sed normal    4/5/23: CBC and CMP (Ca+ now 9.4) ok with PCP ok.     9/19/2023 CBC ok. Sed Rate 5 (<15), CMP ok, AST 12L,  Hep B/C/HIV and Quantiferon not detected, CRP 1 (<10).     11/21/2023 CMP glucose 252, otherwise ok. A1C 6.09, TSH ok.     2/29/2024 CBC hct 42H, otherwise ok. CMP ok. A1C 7.04, CRP 1 (<10).     07/24/2024 CBC platelets 114 okay, CMP glucose 166, otherwise okay, A1c 6.15    10/23/2024 ANTONETTE 1:160 speckled pattern, centromere, RNA P III, Scl 70, U1 RNP, U3 RNP, Thyroblogulin/TPO negative, Pm/Scl 100 and 75 negative - most likely ordered by mistake- CMP suppose to be ordered- ANTONETTE + due to hx of meds - , CRP <0.3 (<0.5), hep B/C/HIV/QuantiFERON negative, CBC okay, ESR 4 (< 15), CMP glucose 144, AST 13 (15-41)    Lab Results   Component Value Date    WBC 7.9 12/01/2022    HGB 13.7 12/01/2022    HCT 40.4 12/01/2022     12/01/2022    ALT 25 12/01/2022    AST 14 12/01/2022     BUN 14.6 12/01/2022    CREATININE 0.90 12/01/2022     12/01/2022    K 4.3 12/01/2022     12/01/2022    CO2 25 12/01/2022    CRP 2.00 12/01/2022    SEDRATE 1 12/01/2022        Imaging:   3/26/2019 XR Hand 3 views of both hands: IMPRESSION: Left/Right: No evidence for inflammatory arthropathy or osteoarthritis. Radial angulation of the distal interphalangeal joints of the fifth fingers. Mild positive ulnar variance.    3/26/2019: Left foot 3 views: Tiny anterior calcaneal spur. IMPRESSION: No significant abnormality identified  Right foot 3 views: Minimal narrowing of the distal interphalangeal joints articular spaces are otherwise normal.    3/26/2019: XR Chest 2 Views: IMPRESSION: No acute Cardiopulmonary process identified.     2/8/2021- XR Lumbar spine 4 views or more: Slight levoscoliosis with maximum curvature at the level of L2/L3 vertebral bodies are of normal height, position and alignment with some degenerative changes of the posterior elements mostly at L5-S1. IMPRESSION: Minimal degenerative changes.    2/7/2024 Right Shoulder Xray: FINDINGS: There is some narrowing of the acromioclavicular joint with minimal degenerative changes of the glenohumeral joint articular spaces otherwise preserved with smooth articular surfaces. Calcification is seen at the insertion of the supraspinatus tendon indicating the presence of calcific tendinitis. Impression: Degenerative changes. Changes of calcific tendinitis.          Assessment:       ICD-10-CM ICD-9-CM   1. Psoriatic arthritis  L40.50 696.0   2. Psoriasis  L40.9 696.1   3. Fibromyalgia  M79.7 729.1   4. High risk medication use  Z79.899 V58.69   5. Drug-induced immunodeficiency  D84.821 279.3    Z79.899 E947.9   6. Primary osteoarthritis involving multiple joints  M15.0 715.98   7. Primary hypertension  I10 401.9   8. Type 2 diabetes mellitus without complication, with long-term current use of insulin  E11.9 250.00    Z79.4 V58.67   9. JENNIFER  (obstructive sleep apnea)  G47.33 327.23   10. Class 2 obesity due to excess calories without serious comorbidity with body mass index (BMI) of 35.0 to 35.9 in adult  E66.812 278.00    E66.09 V85.35    Z68.35    11. Trigger middle finger of right hand  M65.331 727.03   12. Chronic right shoulder pain  M25.511 719.41    G89.29 338.29   13. Positive ANTONETTE (antinuclear antibody)  R76.8 795.79             Plan:     1. Psoriasic Arthritis and Psoriasis  Dx with Psoriasis 1997, dx with Psoriatic Arthritis 2015. Failed treatment with MTX (elevated liver enzymes). Otezla - significant GI  issues. Stelara- lost efficacy.  Has also failed Enbrel in the past. Has been doing well with both Taltz and Otrexup. Today on exam, no active synovitis noted.   - Continue Taltz 80mg every 4 weeks subcu.   - Continue Otrexup 20mg/subcu weekly with daily folic acid supplements.  - Lab today for continued monitoring, wishes to have orders printed out to have completed near home as she has lab q3 months with PCP and apt next Monday  - Last Cxray in 2019   - Infection w/u  negative 10/2024   - She follows up q6 months with lab every 3 months with PCP  - Very faint rash noted to bilateral arms- triamcinolone as directed bid- enc to continue to monitor     3. Fibromyalgia   - Reinforced sleep hygiene, control of underlying anxiety and depression, and encouraged gentle exercise.  - Stable today      4. High risk medication use /drug-induced immunodeficiency  -Advised to stay up-to-date on age appropriate vaccinations and malignancy screening with PCP.   - Mammogram UTD per report, Colonoscopy is due but defers- will discuss with PCP, enc to consider Cologuard, PAPs complete due to hysterectomy   -Persons with rheumatoid arthritis, lupus, psoriatic arthritis and other autoimmune diseases are at increased risk of cardiovascular disease including heart attack and stroke. We recommend that all patients with these conditions have annual health  maintenance exams including lipid measurements, blood pressure measurements, and smoking cessation counseling when applicable at their primary care provider's office.  -Continued lab monitoring  -Flu vaccine UTD with PCP     5. Osteoarthritis  -Knees and b/l 1st MTP joint. +crepitus with knees.   -OTC topicals ok to continue to use, warm compresses and weight loss- just started Mounjaro to help assist with weight loss as well as DM     6. Primary hypertension and T2DM   - Followed by PCP, currently stable and at goal today, , encourage low-sodium diet  -  On  Mounjaro with PCP encouraged diet modifications of sugar/carb/starch intake     8. JENNIFER (obstructive sleep apnea)   - Enc use of CPAP/followed by PCP, currently denies use- reports needs new supplies, enc to follow up and reach out to her PCP for assistance - still has not discussed with PCP     9. Obesity    - Enc diet modifications and exercise as tolerated (TLC)      10.   Trigger finger of middle right hand  - Referred to Ortho- was seen March 2023 and referral to surgeon was made however she has decided to defer surgery at this time and has been tolerable.  - If returns will follow up with Ortho closer to home     11. Right Shoulder Pain  - 2/7/2024 Right Shoulder Xray: FINDINGS: There is some narrowing of the acromioclavicular joint with minimal degenerative changes of the glenohumeral joint articular spaces otherwise preserved with smooth articular surfaces. Calcification is seen at the insertion of the supraspinatus tendon indicating the presence of calcific tendinitis. Impression: Degenerative changes. Changes of calcific tendinitis.   - Deferred PT in the past  - Last IA injection completed 2/24 and doing ok  - Recent fall, was following with PCP- advised if pain continues to consider PT with PCP and possible referral to Ortho  (as discussed previously) closer to home as she wishes to see there vs here.           Follow up in about 6 months (around  9/19/2025) for NP Follow Up. In addition to their scheduled follow up, the patient has also been instructed to follow up on as needed basis.     Total time spent with patient and documentation is 45 minutes. All questions were answered to patient's satisfaction and patient verbalized understanding. This includes face to face time and non-face to face time preparing to see the patient (eg, review of tests), obtaining and/or reviewing separately obtained history, documenting clinical information in the electronic or other health record, independently interpreting results and communicating results to the patient/family/caregiver, or care coordinator.

## 2025-03-19 ENCOUNTER — OFFICE VISIT (OUTPATIENT)
Dept: RHEUMATOLOGY | Facility: CLINIC | Age: 55
End: 2025-03-19
Payer: MEDICAID

## 2025-03-19 VITALS
WEIGHT: 196 LBS | HEART RATE: 94 BPM | HEIGHT: 63 IN | SYSTOLIC BLOOD PRESSURE: 114 MMHG | TEMPERATURE: 98 F | OXYGEN SATURATION: 97 % | DIASTOLIC BLOOD PRESSURE: 83 MMHG | BODY MASS INDEX: 34.73 KG/M2 | RESPIRATION RATE: 20 BRPM

## 2025-03-19 DIAGNOSIS — R76.8 POSITIVE ANA (ANTINUCLEAR ANTIBODY): ICD-10-CM

## 2025-03-19 DIAGNOSIS — D84.821 DRUG-INDUCED IMMUNODEFICIENCY: ICD-10-CM

## 2025-03-19 DIAGNOSIS — M65.331 TRIGGER MIDDLE FINGER OF RIGHT HAND: ICD-10-CM

## 2025-03-19 DIAGNOSIS — E11.9 TYPE 2 DIABETES MELLITUS WITHOUT COMPLICATION, WITH LONG-TERM CURRENT USE OF INSULIN: ICD-10-CM

## 2025-03-19 DIAGNOSIS — L40.9 PSORIASIS: ICD-10-CM

## 2025-03-19 DIAGNOSIS — M25.511 CHRONIC RIGHT SHOULDER PAIN: ICD-10-CM

## 2025-03-19 DIAGNOSIS — M15.0 PRIMARY OSTEOARTHRITIS INVOLVING MULTIPLE JOINTS: ICD-10-CM

## 2025-03-19 DIAGNOSIS — L40.50 PSORIATIC ARTHRITIS: Primary | ICD-10-CM

## 2025-03-19 DIAGNOSIS — G47.33 OSA (OBSTRUCTIVE SLEEP APNEA): ICD-10-CM

## 2025-03-19 DIAGNOSIS — E66.812 CLASS 2 OBESITY DUE TO EXCESS CALORIES WITHOUT SERIOUS COMORBIDITY WITH BODY MASS INDEX (BMI) OF 35.0 TO 35.9 IN ADULT: ICD-10-CM

## 2025-03-19 DIAGNOSIS — I10 PRIMARY HYPERTENSION: ICD-10-CM

## 2025-03-19 DIAGNOSIS — Z79.899 DRUG-INDUCED IMMUNODEFICIENCY: ICD-10-CM

## 2025-03-19 DIAGNOSIS — G89.29 CHRONIC RIGHT SHOULDER PAIN: ICD-10-CM

## 2025-03-19 DIAGNOSIS — E66.09 CLASS 2 OBESITY DUE TO EXCESS CALORIES WITHOUT SERIOUS COMORBIDITY WITH BODY MASS INDEX (BMI) OF 35.0 TO 35.9 IN ADULT: ICD-10-CM

## 2025-03-19 DIAGNOSIS — Z79.899 HIGH RISK MEDICATION USE: ICD-10-CM

## 2025-03-19 DIAGNOSIS — M79.7 FIBROMYALGIA: ICD-10-CM

## 2025-03-19 DIAGNOSIS — Z79.4 TYPE 2 DIABETES MELLITUS WITHOUT COMPLICATION, WITH LONG-TERM CURRENT USE OF INSULIN: ICD-10-CM

## 2025-03-19 PROCEDURE — 99215 OFFICE O/P EST HI 40 MIN: CPT | Mod: PBBFAC | Performed by: NURSE PRACTITIONER

## 2025-03-19 RX ORDER — TIRZEPATIDE 10 MG/.5ML
INJECTION, SOLUTION SUBCUTANEOUS
COMMUNITY
Start: 2025-03-17

## 2025-03-19 RX ORDER — FOLIC ACID 1 MG/1
1000 TABLET ORAL DAILY
Qty: 90 TABLET | Refills: 3 | Status: SHIPPED | OUTPATIENT
Start: 2025-03-19 | End: 2025-07-17

## 2025-03-19 RX ORDER — IXEKIZUMAB 80 MG/ML
INJECTION, SOLUTION SUBCUTANEOUS
Qty: 1 EACH | Refills: 2 | Status: SHIPPED | OUTPATIENT
Start: 2025-03-19

## 2025-03-19 RX ORDER — TRIAMCINOLONE ACETONIDE 1 MG/G
OINTMENT TOPICAL 2 TIMES DAILY
Qty: 80 G | Refills: 5 | Status: SHIPPED | OUTPATIENT
Start: 2025-03-19

## 2025-03-19 RX ORDER — INSULIN LISPRO 100 [IU]/ML
INJECTION, SOLUTION INTRAVENOUS; SUBCUTANEOUS
COMMUNITY
Start: 2024-12-20

## 2025-03-19 RX ORDER — BLOOD-GLUCOSE SENSOR
EACH MISCELLANEOUS
COMMUNITY
Start: 2025-02-24

## 2025-03-25 ENCOUNTER — PATIENT MESSAGE (OUTPATIENT)
Dept: RHEUMATOLOGY | Facility: CLINIC | Age: 55
End: 2025-03-25
Payer: MEDICAID

## 2025-03-26 ENCOUNTER — TELEPHONE (OUTPATIENT)
Dept: RHEUMATOLOGY | Facility: CLINIC | Age: 55
End: 2025-03-26
Payer: MEDICAID

## 2025-03-26 NOTE — TELEPHONE ENCOUNTER
Communicated results from Provider Zaida. Patient verbalized full understanding with no questions voiced upon inquiry.

## 2025-03-26 NOTE — TELEPHONE ENCOUNTER
Remaining labs received and reviewed clinically acceptable, Sm still pending, please place a reminder in the computer to reach out to PCP next week for results

## 2025-03-26 NOTE — TELEPHONE ENCOUNTER
----- Message from BRUNO Adams sent at 3/26/2025  8:02 AM CDT -----  Labs reviewed, clinically acceptable at this time, please place a reminder for 3 months to request labs from PCP- she is also missing C3/C4, ESR, CRP, UPC and Sm- please fax orders to PCPs office to have completed with next lab draw- please also inquire if they have an updated Cxray on her since 2019- thanks  ----- Message -----  From: Darren Umaña LPN  Sent: 3/25/2025   4:51 PM CDT  To: BRUNO Adams    Labs completed 03.24.25 Cbc,Cmp,Ua for Rheum

## 2025-03-28 ENCOUNTER — TELEPHONE (OUTPATIENT)
Dept: RHEUMATOLOGY | Facility: CLINIC | Age: 55
End: 2025-03-28
Payer: MEDICAID

## 2025-03-28 NOTE — TELEPHONE ENCOUNTER
Faxed PCP office requesting to forward any updated Cxray on her since 2019. Success will be scanned to media mgr for reference.    Awaiting PCP response.      Faxed future lab orders (#[id number]) to PCP office Dr Donny Recio to be drawn in future. Success will be scanned to media mgr for reference.   Order date 03.19.25 ---Expires 05.18.26

## 2025-03-28 NOTE — TELEPHONE ENCOUNTER
----- Message from BRUNO Adams sent at 3/28/2025 11:59 AM CDT -----  Remaining lab clinically acceptable  ----- Message -----  From: Sherie Fulton LPN  Sent: 3/28/2025  11:12 AM CDT  To: BRUNO Adams

## 2025-06-03 ENCOUNTER — TELEPHONE (OUTPATIENT)
Dept: RHEUMATOLOGY | Facility: CLINIC | Age: 55
End: 2025-06-03
Payer: MEDICAID

## 2025-06-03 DIAGNOSIS — D84.821 DRUG-INDUCED IMMUNODEFICIENCY: ICD-10-CM

## 2025-06-03 DIAGNOSIS — L40.50 PSORIATIC ARTHRITIS: ICD-10-CM

## 2025-06-03 DIAGNOSIS — Z79.899 DRUG-INDUCED IMMUNODEFICIENCY: ICD-10-CM

## 2025-06-03 DIAGNOSIS — Z79.899 HIGH RISK MEDICATION USE: ICD-10-CM

## 2025-06-03 DIAGNOSIS — R76.8 POSITIVE ANA (ANTINUCLEAR ANTIBODY): ICD-10-CM

## 2025-06-03 DIAGNOSIS — L40.9 PSORIASIS: ICD-10-CM

## 2025-06-03 RX ORDER — FOLIC ACID 1 MG/1
1000 TABLET ORAL DAILY
Qty: 90 TABLET | Refills: 3 | Status: SHIPPED | OUTPATIENT
Start: 2025-06-03

## 2025-07-02 ENCOUNTER — TELEPHONE (OUTPATIENT)
Dept: RHEUMATOLOGY | Facility: CLINIC | Age: 55
End: 2025-07-02
Payer: MEDICAID

## 2025-07-02 DIAGNOSIS — L40.9 PSORIASIS: ICD-10-CM

## 2025-07-02 DIAGNOSIS — Z79.899 HIGH RISK MEDICATION USE: ICD-10-CM

## 2025-07-02 DIAGNOSIS — L40.50 PSORIATIC ARTHRITIS: ICD-10-CM

## 2025-07-02 DIAGNOSIS — R76.8 POSITIVE ANA (ANTINUCLEAR ANTIBODY): ICD-10-CM

## 2025-07-02 DIAGNOSIS — D84.821 DRUG-INDUCED IMMUNODEFICIENCY: ICD-10-CM

## 2025-07-02 DIAGNOSIS — Z79.899 DRUG-INDUCED IMMUNODEFICIENCY: ICD-10-CM

## 2025-07-02 RX ORDER — IXEKIZUMAB 80 MG/ML
INJECTION, SOLUTION SUBCUTANEOUS
Qty: 1 EACH | Refills: 2 | Status: SHIPPED | OUTPATIENT
Start: 2025-07-02

## 2025-07-02 NOTE — TELEPHONE ENCOUNTER
----- Message from BRUNO Reich sent at 7/2/2025  3:33 PM CDT -----  Regarding: RE: Taltz refill  No labs, ok to refill  ----- Message -----  From: Kim Kramer, Aly  Sent: 7/2/2025   3:32 PM CDT  To: BRUNO Noble  Subject: Taltz refill                                     Requesting Taltz refills. Outside labs completed in March 2025. Upcoming appointment scheduled for September 2025. Labs required before refill renewal?